# Patient Record
Sex: FEMALE | Race: WHITE | NOT HISPANIC OR LATINO | Employment: FULL TIME | URBAN - METROPOLITAN AREA
[De-identification: names, ages, dates, MRNs, and addresses within clinical notes are randomized per-mention and may not be internally consistent; named-entity substitution may affect disease eponyms.]

---

## 2019-12-08 ENCOUNTER — NURSE TRIAGE (OUTPATIENT)
Dept: NURSING | Facility: CLINIC | Age: 29
End: 2019-12-08

## 2019-12-09 NOTE — TELEPHONE ENCOUNTER
Patient cut finger with  about 10 mm long and bleeding has stopped. Caller wondering if she would need ER to get stitches if needed.  Patient may not need suturing at all, and is minor per guidelines.  They will go to  just to make sure.    Antonina Sauer RN  [facility]      Additional Information    Negative: [1] Major bleeding (e.g., actively dripping or spurting) AND [2] can't be stopped    Negative: Amputation    Negative: Shock suspected (e.g., cold/pale/clammy skin, too weak to stand, low BP, rapid pulse)    Negative: [1] Knife wound (or other possibly deep cut) AND [2] to chest, abdomen, back, neck, or head    Negative: [1] Cutter (self-mutilator) AND [2] suicidal or out-of-control    Negative: Sounds like a life-threatening emergency to the triager    Negative: [1] Bleeding AND [2] won't stop after 10 minutes of direct pressure (using correct technique)    Negative: Skin is split open or gaping  (or length > 1/2 inch or 12 mm on the skin, 1/4 inch or 6 mm on the face)    Negative: [1] Deep cut AND [2] can see bone or tendons    Negative: Sensation of something in the wound (i.e., retained object in wound)    Negative: [1] Dirt in the wound AND [2] not removed with 15 minutes of scrubbing    Negative: Wound causes numbness (i.e., loss of sensation)    Negative: Wound causes weakness (i.e., decreased ability to move hand, finger, toe)    Negative: [1] SEVERE pain AND [2] not improved 2 hours after pain medicine    Negative: [1] Looks infected AND [2] large red area (>2 inches or 5 cm) or streak    Negative: [1] Fever AND [2] bright red area or streak    Negative: Suspicious history for the injury    Negative: [1] Looks infected (spreading redness, pus) AND [2] no fever    Negative: [1] Last tetanus shot > 5 years ago AND [2] DIRTY cut    Negative: [1] Last tetanus shot > 10 years ago AND [2] CLEAN cut    Negative: No prior tetanus shots    Negative: [1] After 14 days AND [2] wound  isn't healed    Negative: [1] Has diabetes (diabetes mellitus) AND [2] minor cut or scratch on foot    Negative: [1] Cutter (self-mutilator) AND [2] stable (i.e., not suicidal, not out of control)    Minor cut or scratch    Protocols used: CUTS AND [id number]

## 2021-07-26 ENCOUNTER — LAB REQUISITION (OUTPATIENT)
Dept: LAB | Facility: CLINIC | Age: 31
End: 2021-07-26

## 2021-07-26 PROCEDURE — 86706 HEP B SURFACE ANTIBODY: CPT | Performed by: INTERNAL MEDICINE

## 2021-07-26 PROCEDURE — 86481 TB AG RESPONSE T-CELL SUSP: CPT | Performed by: INTERNAL MEDICINE

## 2021-07-26 PROCEDURE — 86787 VARICELLA-ZOSTER ANTIBODY: CPT | Performed by: INTERNAL MEDICINE

## 2021-07-27 LAB
HBV SURFACE AB SERPL IA-ACNC: 106.7 M[IU]/ML
VZV IGG SER QL IA: 803.8 INDEX
VZV IGG SER QL IA: POSITIVE

## 2021-07-31 LAB
GAMMA INTERFERON BACKGROUND BLD IA-ACNC: 0.01 IU/ML
M TB IFN-G BLD-IMP: NEGATIVE
M TB IFN-G CD4+ BCKGRND COR BLD-ACNC: 1.89 IU/ML
MITOGEN IGNF BCKGRD COR BLD-ACNC: 0.03 IU/ML
MITOGEN IGNF BCKGRD COR BLD-ACNC: 0.1 IU/ML
QUANTIFERON MITOGEN: 1.9 IU/ML
QUANTIFERON NIL TUBE: 0.01 IU/ML
QUANTIFERON TB1 TUBE: 0.04 IU/ML
QUANTIFERON TB2 TUBE: 0.11

## 2022-01-01 ENCOUNTER — TRANSFERRED RECORDS (OUTPATIENT)
Dept: MULTI SPECIALTY CLINIC | Facility: CLINIC | Age: 32
End: 2022-01-01

## 2022-01-01 LAB — PAP SMEAR - HIM PATIENT REPORTED: NEGATIVE

## 2022-02-06 ENCOUNTER — HEALTH MAINTENANCE LETTER (OUTPATIENT)
Age: 32
End: 2022-02-06

## 2022-04-14 ENCOUNTER — MEDICAL CORRESPONDENCE (OUTPATIENT)
Dept: HEALTH INFORMATION MANAGEMENT | Facility: CLINIC | Age: 32
End: 2022-04-14

## 2022-04-27 ENCOUNTER — TELEPHONE (OUTPATIENT)
Dept: ONCOLOGY | Facility: CLINIC | Age: 32
End: 2022-04-27

## 2022-06-16 ENCOUNTER — MEDICAL CORRESPONDENCE (OUTPATIENT)
Dept: HEALTH INFORMATION MANAGEMENT | Facility: CLINIC | Age: 32
End: 2022-06-16

## 2022-07-01 ENCOUNTER — OFFICE VISIT (OUTPATIENT)
Dept: URGENT CARE | Facility: URGENT CARE | Age: 32
End: 2022-07-01
Payer: COMMERCIAL

## 2022-07-01 VITALS
TEMPERATURE: 98.7 F | BODY MASS INDEX: 20.73 KG/M2 | RESPIRATION RATE: 18 BRPM | DIASTOLIC BLOOD PRESSURE: 60 MMHG | HEIGHT: 69 IN | WEIGHT: 140 LBS | OXYGEN SATURATION: 98 % | SYSTOLIC BLOOD PRESSURE: 97 MMHG | HEART RATE: 100 BPM

## 2022-07-01 DIAGNOSIS — J06.9 VIRAL URI: Primary | ICD-10-CM

## 2022-07-01 LAB
DEPRECATED S PYO AG THROAT QL EIA: NEGATIVE
GROUP A STREP BY PCR: NOT DETECTED
SARS-COV-2 RNA RESP QL NAA+PROBE: NEGATIVE

## 2022-07-01 PROCEDURE — 87651 STREP A DNA AMP PROBE: CPT | Performed by: FAMILY MEDICINE

## 2022-07-01 PROCEDURE — U0005 INFEC AGEN DETEC AMPLI PROBE: HCPCS | Performed by: FAMILY MEDICINE

## 2022-07-01 PROCEDURE — 99203 OFFICE O/P NEW LOW 30 MIN: CPT | Performed by: FAMILY MEDICINE

## 2022-07-01 PROCEDURE — U0003 INFECTIOUS AGENT DETECTION BY NUCLEIC ACID (DNA OR RNA); SEVERE ACUTE RESPIRATORY SYNDROME CORONAVIRUS 2 (SARS-COV-2) (CORONAVIRUS DISEASE [COVID-19]), AMPLIFIED PROBE TECHNIQUE, MAKING USE OF HIGH THROUGHPUT TECHNOLOGIES AS DESCRIBED BY CMS-2020-01-R: HCPCS | Performed by: FAMILY MEDICINE

## 2022-07-01 NOTE — PROGRESS NOTES
Subjective: A cold seems to be going through the household, both kids had cold symptoms and then he developed ear infections but amazingly they got better so fast with antibiotics that she wondered if there might be strep going on.  She has had 2 days of aches and chills and now today has developed a sore throat with no real congestion or cough.  She has done 3 COVID test at home that are negative.  Her  now has a fever as well.    Objective: Vitals are stable.  NAD.  ENT is normal.  Neck with a few anterior nodes that are slightly tender.  The lungs are clear.  The heart is regular without murmurs.  Abdomen benign.  Strep test negative.    Assessment and plan: Viral URI.  Rule out COVID which seems unlikely with 3 negative tests at home.  We talked about the possibility of influenza but agreed it would not really make any difference.

## 2022-07-20 DIAGNOSIS — M62.830 BACK MUSCLE SPASM: Primary | ICD-10-CM

## 2022-07-20 RX ORDER — METHYLPREDNISOLONE 4 MG
TABLET, DOSE PACK ORAL
Qty: 21 TABLET | Refills: 0 | Status: SHIPPED | OUTPATIENT
Start: 2022-07-20 | End: 2022-08-24

## 2022-08-25 ENCOUNTER — OFFICE VISIT (OUTPATIENT)
Dept: FAMILY MEDICINE | Facility: CLINIC | Age: 32
End: 2022-08-25
Payer: COMMERCIAL

## 2022-08-25 VITALS
WEIGHT: 141 LBS | DIASTOLIC BLOOD PRESSURE: 65 MMHG | RESPIRATION RATE: 16 BRPM | BODY MASS INDEX: 21.37 KG/M2 | HEIGHT: 68 IN | TEMPERATURE: 97.2 F | SYSTOLIC BLOOD PRESSURE: 107 MMHG | OXYGEN SATURATION: 98 % | HEART RATE: 79 BPM

## 2022-08-25 DIAGNOSIS — Z87.59 HISTORY OF AMNIOTIC FLUID EMBOLISM: ICD-10-CM

## 2022-08-25 DIAGNOSIS — Z13.0 SCREENING, ANEMIA, DEFICIENCY, IRON: ICD-10-CM

## 2022-08-25 DIAGNOSIS — Z87.42 HISTORY OF ABNORMAL CERVICAL PAP SMEAR: ICD-10-CM

## 2022-08-25 DIAGNOSIS — Z13.6 ENCOUNTER FOR LIPID SCREENING FOR CARDIOVASCULAR DISEASE: ICD-10-CM

## 2022-08-25 DIAGNOSIS — Z13.1 SCREENING FOR DIABETES MELLITUS: ICD-10-CM

## 2022-08-25 DIAGNOSIS — Z00.00 HEALTH CARE MAINTENANCE: ICD-10-CM

## 2022-08-25 DIAGNOSIS — Z11.4 SCREENING FOR HIV (HUMAN IMMUNODEFICIENCY VIRUS): ICD-10-CM

## 2022-08-25 DIAGNOSIS — Z11.59 NEED FOR HEPATITIS C SCREENING TEST: ICD-10-CM

## 2022-08-25 DIAGNOSIS — N64.4 BREAST TENDERNESS: ICD-10-CM

## 2022-08-25 DIAGNOSIS — Z12.4 CERVICAL CANCER SCREENING: ICD-10-CM

## 2022-08-25 DIAGNOSIS — E28.2 PCOS (POLYCYSTIC OVARIAN SYNDROME): ICD-10-CM

## 2022-08-25 DIAGNOSIS — Z13.220 ENCOUNTER FOR LIPID SCREENING FOR CARDIOVASCULAR DISEASE: ICD-10-CM

## 2022-08-25 DIAGNOSIS — Z00.00 ROUTINE GENERAL MEDICAL EXAMINATION AT A HEALTH CARE FACILITY: Primary | ICD-10-CM

## 2022-08-25 PROBLEM — O88.113: Status: ACTIVE | Noted: 2021-12-05

## 2022-08-25 PROBLEM — O99.820 GBS (GROUP B STREPTOCOCCUS CARRIER), +RV CULTURE, CURRENTLY PREGNANT: Status: RESOLVED | Noted: 2021-11-26 | Resolved: 2022-08-25

## 2022-08-25 PROBLEM — O99.820 GBS (GROUP B STREPTOCOCCUS CARRIER), +RV CULTURE, CURRENTLY PREGNANT: Status: ACTIVE | Noted: 2021-11-26

## 2022-08-25 PROBLEM — O88.113: Status: RESOLVED | Noted: 2021-12-05 | Resolved: 2022-08-25

## 2022-08-25 LAB
ALBUMIN SERPL-MCNC: 4.1 G/DL (ref 3.4–5)
ALP SERPL-CCNC: 90 U/L (ref 40–150)
ALT SERPL W P-5'-P-CCNC: 20 U/L (ref 0–50)
ANION GAP SERPL CALCULATED.3IONS-SCNC: 5 MMOL/L (ref 3–14)
AST SERPL W P-5'-P-CCNC: 15 U/L (ref 0–45)
BASOPHILS # BLD AUTO: 0 10E3/UL (ref 0–0.2)
BASOPHILS NFR BLD AUTO: 1 %
BILIRUB SERPL-MCNC: 1.1 MG/DL (ref 0.2–1.3)
BUN SERPL-MCNC: 10 MG/DL (ref 7–30)
CALCIUM SERPL-MCNC: 8.8 MG/DL (ref 8.5–10.1)
CHLORIDE BLD-SCNC: 109 MMOL/L (ref 94–109)
CHOLEST SERPL-MCNC: 148 MG/DL
CO2 SERPL-SCNC: 25 MMOL/L (ref 20–32)
CREAT SERPL-MCNC: 0.84 MG/DL (ref 0.52–1.04)
EOSINOPHIL # BLD AUTO: 0.2 10E3/UL (ref 0–0.7)
EOSINOPHIL NFR BLD AUTO: 6 %
ERYTHROCYTE [DISTWIDTH] IN BLOOD BY AUTOMATED COUNT: 13.2 % (ref 10–15)
FASTING STATUS PATIENT QL REPORTED: YES
GFR SERPL CREATININE-BSD FRML MDRD: >90 ML/MIN/1.73M2
GLUCOSE BLD-MCNC: 94 MG/DL (ref 70–99)
HBA1C MFR BLD: 5.3 % (ref 0–5.6)
HCT VFR BLD AUTO: 42.2 % (ref 35–47)
HCV AB SERPL QL IA: NONREACTIVE
HDLC SERPL-MCNC: 58 MG/DL
HGB BLD-MCNC: 13.8 G/DL (ref 11.7–15.7)
HIV 1+2 AB+HIV1 P24 AG SERPL QL IA: NONREACTIVE
IMM GRANULOCYTES # BLD: 0 10E3/UL
IMM GRANULOCYTES NFR BLD: 0 %
LDLC SERPL CALC-MCNC: 80 MG/DL
LYMPHOCYTES # BLD AUTO: 1.4 10E3/UL (ref 0.8–5.3)
LYMPHOCYTES NFR BLD AUTO: 35 %
MCH RBC QN AUTO: 28.7 PG (ref 26.5–33)
MCHC RBC AUTO-ENTMCNC: 32.7 G/DL (ref 31.5–36.5)
MCV RBC AUTO: 88 FL (ref 78–100)
MONOCYTES # BLD AUTO: 0.3 10E3/UL (ref 0–1.3)
MONOCYTES NFR BLD AUTO: 7 %
NEUTROPHILS # BLD AUTO: 2.1 10E3/UL (ref 1.6–8.3)
NEUTROPHILS NFR BLD AUTO: 51 %
NONHDLC SERPL-MCNC: 90 MG/DL
PLATELET # BLD AUTO: 261 10E3/UL (ref 150–450)
POTASSIUM BLD-SCNC: 4.4 MMOL/L (ref 3.4–5.3)
PROT SERPL-MCNC: 7.9 G/DL (ref 6.8–8.8)
RBC # BLD AUTO: 4.81 10E6/UL (ref 3.8–5.2)
SODIUM SERPL-SCNC: 139 MMOL/L (ref 133–144)
TRIGL SERPL-MCNC: 49 MG/DL
TSH SERPL DL<=0.005 MIU/L-ACNC: 0.8 MU/L (ref 0.4–4)
WBC # BLD AUTO: 4 10E3/UL (ref 4–11)

## 2022-08-25 PROCEDURE — 87389 HIV-1 AG W/HIV-1&-2 AB AG IA: CPT | Performed by: FAMILY MEDICINE

## 2022-08-25 PROCEDURE — 99395 PREV VISIT EST AGE 18-39: CPT | Performed by: FAMILY MEDICINE

## 2022-08-25 PROCEDURE — 80050 GENERAL HEALTH PANEL: CPT | Performed by: FAMILY MEDICINE

## 2022-08-25 PROCEDURE — 86803 HEPATITIS C AB TEST: CPT | Performed by: FAMILY MEDICINE

## 2022-08-25 PROCEDURE — 80061 LIPID PANEL: CPT | Performed by: FAMILY MEDICINE

## 2022-08-25 PROCEDURE — 83036 HEMOGLOBIN GLYCOSYLATED A1C: CPT | Performed by: FAMILY MEDICINE

## 2022-08-25 PROCEDURE — 36415 COLL VENOUS BLD VENIPUNCTURE: CPT | Performed by: FAMILY MEDICINE

## 2022-08-25 ASSESSMENT — ENCOUNTER SYMPTOMS
BREAST MASS: 0
NERVOUS/ANXIOUS: 0
MYALGIAS: 0
HEARTBURN: 0
HEMATURIA: 0
ABDOMINAL PAIN: 0
JOINT SWELLING: 0
FEVER: 0
DIARRHEA: 0
HEADACHES: 0
EYE PAIN: 0
WEAKNESS: 0
SORE THROAT: 0
COUGH: 0
PARESTHESIAS: 0
HEMATOCHEZIA: 0
CONSTIPATION: 0
DYSURIA: 0
DIZZINESS: 0
FREQUENCY: 0
PALPITATIONS: 0
SHORTNESS OF BREATH: 0
CHILLS: 0
NAUSEA: 0
ARTHRALGIAS: 0

## 2022-08-25 NOTE — RESULT ENCOUNTER NOTE
Dunia Ms. Martinez,  Your results came back and are within acceptable limits. -Hepatitis C antibody screen test shows no signs of a previous hepatitis C infection.  -HIV test is normal.. If you have any further concerns please do not hesitate to contact us by message, phone or making an appointment.  Have a good day   Dr Franco DUKE

## 2022-08-25 NOTE — RESULT ENCOUNTER NOTE
Dunia Ms. Martinez,  Your results came back and are within acceptable limits. -Cholesterol levels (LDL,HDL, Triglycerides) are normal.  ADVISE: rechecking in 1 year.   -Liver and gallbladder tests are normal (ALT,AST, Alk phos, bilirubin), kidney function is normal (Cr, GFR), sodium is normal, potassium is normal, calcium is normal, glucose is normal.  -TSH (thyroid stimulating hormone) level is normal which indicates normal thyroid function.. If you have any further concerns please do not hesitate to contact us by message, phone or making an appointment.  Have a good day   Dr Franco DUKE

## 2022-08-25 NOTE — RESULT ENCOUNTER NOTE
Dunia CohenJesus Martinez,  Some of your results came back and are within acceptable limits. -Normal red blood cell (hgb) levels, normal white blood cell count and normal platelet levels.  -A1C (diabetic test) is normal and indicates that your blood sugar has been in a normal range the last 3 months.. If you have any further concerns please do not hesitate to contact us by message, phone or making an appointment.  Have a good day   Dr Franco DUKE

## 2022-08-25 NOTE — Clinical Note
Patient reports had a pap in 1/2022 which was normal. I cant see in careeverywhere. If your fid it let me know. Maybw was done else where not sure

## 2022-08-25 NOTE — PROGRESS NOTES
SUBJECTIVE:   CC: Shantel Martinez is an 32 year old woman who presents for preventive health visit.     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 ,  PA in GI oncology at the , lives with  and 2 sons ( almost 3 & 8 month old), has 2 cats, with hx of PCOS( irregular periods no other symptoms, able to get pregnant x 2 without help), remote abnormal pap's in past, ASCUS with HR HPV in , LSIL in , s/p colposcopy, & ECC n her 20's was normal, , hx of GBS treated in , amniotic fluid embolism in 3rd trimester requiring an emergency C section in dec 2021, hx of post partum anxiety , improved with therapy, didnt find citalopram helpful, hx of mastitis, hx of wisdom teeth extraction, allergic to sulfa, previously under care of PCP at [facility] and OB at [facility]    [place]  ( [facility]  to present  2003: [facility]: 13 yr [facility], uri's  2005: uri's  x2  2006: headache, corneal abrasion,   2007: left ankle sprain, scoliosis, primary amenorrhea, right thumb contusion with mild subungual hematoma  2008: obstipation & abdominal pain, menarche age 19, sinusitis, bronchitis, secondary amenorrhea, workup negative, positive progesterone withdrawal bleeding  2009; right breast lump prior breast u/s negative, secondary amenorrhea, sports physical, pharyngitis, right ankle sprain, college physical( [facility])   2010: pap done, on BCP for menstruation, , cystitis,   2011:  BPPV, wellwoman, heart burn, travel to [place],   : s/B gyn for secondary amenorrhea,  Leg pain referred to PT, restarted combined ocp 2012 for irregular cycles and acne, costochondral pain,   : ASCUS HR HPV, colposcopy, in PA school, intermenstrual spotting,    [facility] now: LSIL, dysuria, annual physical with OB,   2015: PA school [employer] for physical , left upper trapezius spasm, strep pharyngitis,   2016: annual physical, on [facility]  2019: preconception counseling, noted was diagnosed with PCOS around age 18 secondary to infrequent menstrual cycles and pelvic ultrasound showing multiple cysts on the ovaries. She has been managed with OCP's since then and has had regular, monthly menstrual cycles with OCP's. She stopped her birth control pill back in 2018 in an attempt to start for pregnancy, infertility eval started.   2019 first ob visit. Routine prenatal checks, finger laceration on a spiralizer, closed with derma bond,  2020:   ; post partum check, mastitis,   2021 noted second pregnancy u/s, regular prenatal checks,   21: Patient presented in labor  and suspected amniotic fluid embolism immediately following AROM given vital sign and patient changes. She had two episodes of decompensation, leading to emergent PLTCS under GETA on 21. Extubated following delivery. Monitored in ICU overnight for 8 hours s/p primary CS following likely AFE. Since delivery vital signs stable, no evidence of DIC/coagulopathy.   Postpartum course was uncomplicated.  22 routine post partum check: notedthe experience was very scary for both her and her  and she spent a short time in the ICU before being transferred back to L&D floor. She did not have DIC or other complications. After being discharged, her whole family got COVID-19 including her baby which made the 1st few weeks postpartum very difficult as well. She also had mastitis which was treated at the  end of the year 2021 1/28/22 telemed for post partum anxiety/ depression. Seeing a therapist. Given Celexa.     Here for a physical as insurance changed & not seen a PCP joel while, but was under care of OB till 6 months ago.  Notes has had left breast tenderness. 8 months ago delivered a baby, had hx of two bouts of mastitis treated with antibiotics and symptomatic care. Stopped breast feeding in 3/2022. Left breat tenderness lingers n. Breasts feel lumpy, no discrete lump, nipple discharge or skin changes. Not any worse but unchanged. Has had resumption of menstruation since stopped breast feeding. Pain fluctuates,comes and goes, not sure correlation to cycle which has bene historically irregular. LMP currently started Monday so day 4 today.   No fh of breast, ovarian or colon cancer  Not on BCP currently, using condoms.     Hx of amniotic fluid embolism in 3rd trimester after AROM while in labour in dec 2021, lost consciousness x 2, intubated had emergency C section. Was in ICU, told had fluid in lung that stabilized. Was advised to seek [facility] care prior to getting pregnant again so would like a referral.     Postpartum anxiety/depression resolved with therapy. Had tried Celexa initially but stopped as not helpful    Reports had pap post partum normal in 1/202. No records visible in care everywhere. Told to recheck in 3 yrs. Remote abnormal when [age], had ASCUS with HR HPV, s/p colpo showed LSIL in 2014, reported normal since then pat 9 yrs.    Hx of reported PCOs, irregular period, cysts on ovary when evaluated age 18 for secondary amenorrhea. No hirsutism or known insulin resistance. Has irregular periods off ocp, Ovulates 2 to 3 / yr. but able to tell when ovulating with temperature and was able to get pregnant x 2 without any help. Currently using condoms     reviewed  No ACP on file, working on it at home, no infor needed  Will do labs today     Today's PHQ-2 Score:   PHQ-2 ( 1999 Pf[employer]) 8/25/2022   Q1:  Little interest or pleasure in doing things 0   Q2: Feeling down, depressed or hopeless 0   PHQ-2 Score 0   Q1: Little interest or pleasure in doing things Not at all   Q2: Feeling down, depressed or hopeless Not at all   PHQ-2 Score 0     Abuse: Current or Past (Physical, Sexual or Emotional) - No  Do you feel safe in your environment? Yes    Have you ever done Advance Care Planning? (For example, a Health Directive, POLST, or a discussion with a medical provider or your loved ones about your wishes): No, advance care planning information given to patient to review.  Patient plans to discuss their wishes with loved ones or provider.      Social History     Tobacco Use     Smoking status: Never Smoker     Smokeless tobacco: Never Used   Substance Use Topics     Alcohol use: Not on file     If you drink alcohol do you typically have >3 drinks per day or >7 drinks per week? No    Alcohol Use 2022   Prescreen: >3 drinks/day or >7 drinks/week? No   Prescreen: >3 drinks/day or >7 drinks/week? -   No flowsheet data found.    Reviewed orders with patient.  Reviewed health maintenance and updated orders accordingly - Yes  Lab work is in process  Labs reviewed in EPIC  BP Readings from Last 3 Encounters:   22 107/65   22 97/60    Wt Readings from Last 3 Encounters:   22 64 kg (141 lb)   22 63.5 kg (140 lb)          Patient Active Problem List   Diagnosis     PCOS (polycystic ovarian syndrome)     History of amniotic fluid embolism     Past Surgical History:   Procedure Laterality Date      SECTION      due toamniotic fluid embolism     COLPOSCOPY CERVIX, BIOPSY CERVIX, ENDOCERVICAL CURETTAGE, COMBINED  2014     WISDOM TOOTH EXTRACTION         Social History     Tobacco Use     Smoking status: Never Smoker     Smokeless tobacco: Never Used   Substance Use Topics     Alcohol use: Yes     Comment: occasional     Family History   Problem Relation Age of Onset     Prostate Cancer Father 55          No current outpatient medications on file.     Allergies   Allergen Reactions     Bactrim [Sulfamethoxazole W/Trimethoprim] Other (See Comments)     Throat swelling     Recent Labs   Lab Test 22  0952   A1C 5.3   LDL 80   HDL 58   TRIG 49   ALT 20   CR 0.84   GFRESTIMATED >90   POTASSIUM 4.4   TSH 0.80        Breast Cancer Screening:    FHS-7: No flowsheet data found.    Patient under 40 years of age: Routine Mammogram Screening not recommended.   Pertinent mammograms are reviewed under the imaging tab.    History of abnormal Pap smear: NO - age 30- 65 PAP every 3 years recommended  NO - age 30-65 PAP every 5 years with negative HPV co-testing recommended  Last 3 Pap Results: No results found for: PAP  Last 3 Pap and HPV Results:       Reviewed and updated as needed this visit by clinical staff   Tobacco  Allergies  Meds   Med Hx  Surg Hx  Fam Hx  Soc Hx          Reviewed and updated as needed this visit by Provider                   Past Medical History:   Diagnosis Date     Amniotic fluid embolus, third trimester 2021     GBS (group B Streptococcus carrier), +RV culture, currently pregnant 2021     History of abnormal Pap smear 2014    Formatting of this note is different from the original. Date Procedure Result Plan  2013 Pap ASCUS HPV+   2014 Pap LSIL Colpo  2014 Colpo ECC negative Pap 1 year      (normal spontaneous vaginal delivery)       Past Surgical History:   Procedure Laterality Date      SECTION      due toamniotic fluid embolism     COLPOSCOPY CERVIX, BIOPSY CERVIX, ENDOCERVICAL CURETTAGE, COMBINED       WISDOM TOOTH EXTRACTION       OB History    Para Term  AB Living   2 2 2 0 0 2   SAB IAB Ectopic Multiple Live Births   0 0 0 0 2      # Outcome Date GA Lbr Anand/2nd Weight Sex Delivery Anes PTL Lv   2 Term 21 38w0d  3.544 kg (7 lb 13 oz) M CS-Unspec   GATO      Name: DECLAN SARMIENTO      Apgar1: 9  Apgar5: 9   1 Term 20  "39w1d / 00:27 3.232 kg (7 lb 2 oz) M Vag-Spont   GATO      Name: DECLAN SARMIENTO      Apgar1: 9  Apgar5: 9       Review of Systems   Constitutional: Negative for chills and fever.   HENT: Negative for congestion, ear pain, hearing loss and sore throat.    Eyes: Negative for pain and visual disturbance.   Respiratory: Negative for cough and shortness of breath.    Cardiovascular: Negative for chest pain, palpitations and peripheral edema.   Gastrointestinal: Negative for abdominal pain, constipation, diarrhea, heartburn, hematochezia and nausea.   Breasts:  Positive for tenderness. Negative for breast mass and discharge.   Genitourinary: Negative for dysuria, frequency, genital sores, hematuria, pelvic pain, urgency, vaginal bleeding and vaginal discharge.   Musculoskeletal: Negative for arthralgias, joint swelling and myalgias.   Skin: Negative for rash.   Neurological: Negative for dizziness, weakness, headaches and paresthesias.   Psychiatric/Behavioral: Negative for mood changes. The patient is not nervous/anxious.       OBJECTIVE:   /65 (BP Location: Right arm, Patient Position: Sitting, Cuff Size: Adult Regular)   Pulse 79   Temp 97.2  F (36.2  C) (Oral)   Resp 16   Ht 1.73 m (5' 8.11\")   Wt 64 kg (141 lb)   LMP 08/25/2022 (Approximate)   SpO2 98%   BMI 21.37 kg/m    Physical Exam  GENERAL: healthy, alert and no distress  EYES: Eyes grossly normal to inspection, PERRL and conjunctivae and sclerae normal  HENT: ear canals and TM's normal, nose and mouth without ulcers or lesions  NECK: no adenopathy, no asymmetry, masses, or scars and thyroid normal to palpation  RESP: lungs clear to auscultation - no rales, rhonchi or wheezes  BREAST: normal without masses, tenderness or nipple discharge and no palpable axillary masses or adenopathy  CV: regular rate and rhythm, normal S1 S2, no S3 or S4, no murmur, click or rub, no peripheral edema and peripheral pulses strong  ABDOMEN: soft, nontender, no " hepatosplenomegaly, no masses and bowel sounds normal  MS: no gross musculoskeletal defects noted, no edema  SKIN: no suspicious lesions or rashes  NEURO: Normal strength and tone, mentation intact and speech normal  PSYCH: mentation appears normal, affect normal/bright  LYMPH: no cervical, supraclavicular, axillary, or inguinal adenopathy    Diagnostic Test Results:  Labs reviewed in Epic  No results found for this or any previous visit (from the past 24 hour(s)).  Results for orders placed or performed in visit on 08/25/22   HIV Antigen Antibody Combo     Status: Normal   Result Value Ref Range    HIV Antigen Antibody Combo Nonreactive Nonreactive   Hepatitis C Screen Reflex to HCV RNA Quant and Genotype     Status: Normal   Result Value Ref Range    Hepatitis C Antibody Nonreactive Nonreactive    Narrative    Assay performance characteristics have not been established for newborns, infants, and children.   Comprehensive metabolic panel (BMP + Alb, Alk Phos, ALT, AST, Total. Bili, TP)     Status: Normal   Result Value Ref Range    Sodium 139 133 - 144 mmol/L    Potassium 4.4 3.4 - 5.3 mmol/L    Chloride 109 94 - 109 mmol/L    Carbon Dioxide (CO2) 25 20 - 32 mmol/L    Anion Gap 5 3 - 14 mmol/L    Urea Nitrogen 10 7 - 30 mg/dL    Creatinine 0.84 0.52 - 1.04 mg/dL    Calcium 8.8 8.5 - 10.1 mg/dL    Glucose 94 70 - 99 mg/dL    Alkaline Phosphatase 90 40 - 150 U/L    AST 15 0 - 45 U/L    ALT 20 0 - 50 U/L    Protein Total 7.9 6.8 - 8.8 g/dL    Albumin 4.1 3.4 - 5.0 g/dL    Bilirubin Total 1.1 0.2 - 1.3 mg/dL    GFR Estimate >90 >60 mL/min/1.73m2   Lipid Profile (Chol, Trig, HDL, LDL calc)     Status: None   Result Value Ref Range    Cholesterol 148 <200 mg/dL    Triglycerides 49 <150 mg/dL    Direct Measure HDL 58 >=50 mg/dL    LDL Cholesterol Calculated 80 <=100 mg/dL    Non HDL Cholesterol 90 <130 mg/dL    Patient Fasting > 8hrs? Yes     Narrative    Cholesterol  Desirable:  <200 mg/dL    Triglycerides  Normal:  Less  than 150 mg/dL  Borderline High:  150-199 mg/dL  High:  200-499 mg/dL  Very High:  Greater than or equal to 500 mg/dL    Direct Measure HDL  Female:  Greater than or equal to 50 mg/dL   Male:  Greater than or equal to 40 mg/dL    LDL Cholesterol  Desirable:  <100mg/dL  Above Desirable:  100-129 mg/dL   Borderline High:  130-159 mg/dL   High:  160-189 mg/dL   Very High:  >= 190 mg/dL    Non HDL Cholesterol  Desirable:  130 mg/dL  Above Desirable:  130-159 mg/dL  Borderline High:  160-189 mg/dL  High:  190-219 mg/dL  Very High:  Greater than or equal to 220 mg/dL   TSH with free T4 reflex     Status: Normal   Result Value Ref Range    TSH 0.80 0.40 - 4.00 mU/L   Hemoglobin A1c     Status: Normal   Result Value Ref Range    Hemoglobin A1C 5.3 0.0 - 5.6 %   CBC with platelets and differential     Status: None   Result Value Ref Range    WBC Count 4.0 4.0 - 11.0 10e3/uL    RBC Count 4.81 3.80 - 5.20 10e6/uL    Hemoglobin 13.8 11.7 - 15.7 g/dL    Hematocrit 42.2 35.0 - 47.0 %    MCV 88 78 - 100 fL    MCH 28.7 26.5 - 33.0 pg    MCHC 32.7 31.5 - 36.5 g/dL    RDW 13.2 10.0 - 15.0 %    Platelet Count 261 150 - 450 10e3/uL    % Neutrophils 51 %    % Lymphocytes 35 %    % Monocytes 7 %    % Eosinophils 6 %    % Basophils 1 %    % Immature Granulocytes 0 %    Absolute Neutrophils 2.1 1.6 - 8.3 10e3/uL    Absolute Lymphocytes 1.4 0.8 - 5.3 10e3/uL    Absolute Monocytes 0.3 0.0 - 1.3 10e3/uL    Absolute Eosinophils 0.2 0.0 - 0.7 10e3/uL    Absolute Basophils 0.0 0.0 - 0.2 10e3/uL    Absolute Immature Granulocytes 0.0 <=0.4 10e3/uL   CBC with platelets and differential     Status: None    Narrative    The following orders were created for panel order CBC with platelets and differential.  Procedure                               Abnormality         Status                     ---------                               -----------         ------                     CBC with platelets and d...[[id number]]                      Final result                  Please view results for these tests on the individual orders.       ASSESSMENT/PLAN:       ICD-10-CM    1. Routine general medical examination at a health care facility  Z00.00 CBC with platelets and differential     Comprehensive metabolic panel (BMP + Alb, Alk Phos, ALT, AST, Total. Bili, TP)     Lipid Profile (Chol, Trig, HDL, LDL calc)     TSH with free T4 reflex     Hemoglobin A1c     CBC with platelets and differential     Comprehensive metabolic panel (BMP + Alb, Alk Phos, ALT, AST, Total. Bili, TP)     Lipid Profile (Chol, Trig, HDL, LDL calc)     TSH with free T4 reflex     Hemoglobin A1c   2. Cervical cancer screening  Z12.4     reports had pap post partum 1/2022 was normal   3. History of abnormal cervical Pap smear  Z87.42    4. Breast tenderness  N64.4 US Breast Left Limited 1-3 Quadrants     MA Diagnostic Digital Left     TSH with free T4 reflex     TSH with free T4 reflex    left only    5. History of amniotic fluid embolism  Z87.59 Mat Fetal Med CTR Referral - Preconception   6. PCOS (polycystic ovarian syndrome)  E28.2 TSH with free T4 reflex     Hemoglobin A1c     TSH with free T4 reflex     Hemoglobin A1c    irregular periods, no other symptoms, got pregnant x 2 without help   7. Health care maintenance  Z00.00 REVIEW OF HEALTH MAINTENANCE PROTOCOL ORDERS   8. Screening for HIV (human immunodeficiency virus)  Z11.4 HIV Antigen Antibody Combo     HIV Antigen Antibody Combo   9. Need for hepatitis C screening test  Z11.59 Hepatitis C Screen Reflex to HCV RNA Quant and Genotype     Hepatitis C Screen Reflex to HCV RNA Quant and Genotype   10. Screening, anemia, deficiency, iron  Z13.0 CBC with platelets and differential     CBC with platelets and differential   11. Screening for diabetes mellitus  Z13.1 Comprehensive metabolic panel (BMP + Alb, Alk Phos, ALT, AST, Total. Bili, TP)     Comprehensive metabolic panel (BMP + Alb, Alk Phos, ALT, AST, Total. Bili, TP)   12. Encounter for  lipid screening for cardiovascular disease  Z13.220 Lipid Profile (Chol, Trig, HDL, LDL calc)    Z13.6 Lipid Profile (Chol, Trig, HDL, LDL calc)     Seen for preventive health and additional concerns today     For persistent left breast tenderness despite treatment and resolution of mastitis will do a breast u/s, +/- diagnostic mammogram. Unknown if correlating to menstrual cycle resumed since breast feeding stopped in march 2022. Exam normal , has dense breasts.   Continue self breast check regularly   Consider referral to a  to assess personal risk if should have any family hx of breast, ovarian or bowel cancer  Has No fh of breast, ovarian or colon cancer  Not on BCP currently, using condoms.     Remote abnormal when age [age], had ASCUS with HR HPV, s/p colpo showed LSIL in 2014, reported normal since then pat 9 yrs.Reports had a pap in 1/2022 which was normal. I cant see in careeverywhere. Reports due 2024.     Hx of amniotic fluid embolism in 3rd trimester after AROM while in labour in dec 2021, lost consciousness x 2, intubated & had emergency C section. Was in ICU, post surgery for monitoring. Was advised to seek MFM care prior to getting pregnant again so would like a referral. MFM preconception counseling referral placed.    Postpartum anxiety/depression resolved with therapy. Had tried Celexa initially but stopped as not helpful. Currently notes doing ok.     Hx of reported PCOs, irregular period, cysts on ovary when evaluated age 18 for secondary amenorrhea. No hirsutism or known insulin resistance. Has irregular periods off ocp, Ovulates 2 to 3 / yr. but able to tell when ovulating with temperature and was able to get pregnant x 2 without any help. Currently using condoms     reviewed  No ACP on file, working on it at home, no infor needed  Covid vaccine utd ( had covid in 1/2022 post partum)   Recommend Flu shot yearly  Recommend Tdap every 10 yrs  If travelling out of the country recommend  "seeing the travel clinic to update appropriate shots etc  Labs today and will make further recommendations once reviewed    Return in 1 yr for a preventive physical and sooner in an office visit for any new concerns.     Patient has been advised of split billing requirements and indicates understanding: Yes    COUNSELING:  Reviewed preventive health counseling, as reflected in patient instructions       Regular exercise       Healthy diet/nutrition       Vision screening       Immunizations       Contraception       Family planning       Consider Hep C screening for all patients one time for ages 18-79 years       HIV screeninx in teen years, 1x in adult years, and at intervals if high risk       The ASCVD Risk score ([place] Jr[place] et al., 2013) failed to calculate for the following reasons:    The 2013 ASCVD risk score is only valid for ages 40 to 79       Advance Care Planning    Estimated body mass index is 21.37 kg/m  as calculated from the following:    Height as of this encounter: 1.73 m (5' 8.11\").    Weight as of this encounter: 64 kg (141 lb).    Weight management plan noted, stable and monitoring    She reports that she has never smoked. She has never used smokeless tobacco.      Counseling Resources:  ATP IV Guidelines  Pooled Cohorts Equation Calculator  Breast Cancer Risk Calculator  BRCA-Related Cancer Risk Assessment: FHS-7 Tool  FRAX Risk Assessment  ICSI Preventive Guidelines  Dietary Guidelines for Americans,   USDA's MyPlate  ASA Prophylaxis  Lung CA Screening    Kanika Gamez MD  [facility]  "

## 2022-08-25 NOTE — PATIENT INSTRUCTIONS
See notes    Preventive Health Recommendations  Female Ages 26 - 39  Yearly exam:   See your health care provider every year in order to  Review health changes.   Discuss preventive care.    Review your medicines if you your doctor has prescribed any.    Until age 30: Get a Pap test every three years (more often if you have had an abnormal result).    After age 30: Talk to your doctor about whether you should have a Pap test every 3 years or have a Pap test with HPV screening every 5 years.   You do not need a Pap test if your uterus was removed (hysterectomy) and you have not had cancer.  You should be tested each year for STDs (sexually transmitted diseases), if you're at risk.   Talk to your provider about how often to have your cholesterol checked.  If you are at risk for diabetes, you should have a diabetes test (fasting glucose).  Shots: Get a flu shot each year. Get a tetanus shot every 10 years.   Nutrition:   Eat at least 5 servings of fruits and vegetables each day.  Eat whole-grain bread, whole-wheat pasta and brown rice instead of white grains and rice.  Get adequate Calcium and Vitamin D.     Lifestyle  Exercise at least 150 minutes a week (30 minutes a day, 5 days of the week). This will help you control your weight and prevent disease.  Limit alcohol to one drink per day.  No smoking.   Wear sunscreen to prevent skin cancer.  See your dentist every six months for an exam and cleaning.

## 2022-08-26 DIAGNOSIS — Z87.59 HISTORY OF AMNIOTIC FLUID EMBOLISM: Primary | ICD-10-CM

## 2022-08-28 PROBLEM — Z87.59: Status: ACTIVE | Noted: 2022-08-28

## 2022-09-08 ENCOUNTER — ANCILLARY PROCEDURE (OUTPATIENT)
Dept: MAMMOGRAPHY | Facility: CLINIC | Age: 32
End: 2022-09-08
Attending: FAMILY MEDICINE
Payer: COMMERCIAL

## 2022-09-08 DIAGNOSIS — N64.4 BREAST TENDERNESS: ICD-10-CM

## 2022-09-08 PROCEDURE — 76882 US LMTD JT/FCL EVL NVASC XTR: CPT | Mod: RT | Performed by: STUDENT IN AN ORGANIZED HEALTH CARE EDUCATION/TRAINING PROGRAM

## 2022-09-09 ENCOUNTER — ANCILLARY PROCEDURE (OUTPATIENT)
Dept: MAMMOGRAPHY | Facility: CLINIC | Age: 32
End: 2022-09-09
Attending: FAMILY MEDICINE
Payer: COMMERCIAL

## 2022-09-09 DIAGNOSIS — R92.1 BREAST CALCIFICATIONS: ICD-10-CM

## 2022-09-09 DIAGNOSIS — N64.4 BREAST TENDERNESS: ICD-10-CM

## 2022-09-09 PROCEDURE — 88305 TISSUE EXAM BY PATHOLOGIST: CPT | Mod: 26 | Performed by: PATHOLOGY

## 2022-09-09 PROCEDURE — 19081 BX BREAST 1ST LESION STRTCTC: CPT | Mod: LT | Performed by: RADIOLOGY

## 2022-09-09 PROCEDURE — 88305 TISSUE EXAM BY PATHOLOGIST: CPT | Mod: TC | Performed by: FAMILY MEDICINE

## 2022-09-09 RX ORDER — LIDOCAINE HYDROCHLORIDE AND EPINEPHRINE 10; 10 MG/ML; UG/ML
10 INJECTION, SOLUTION INFILTRATION; PERINEURAL ONCE
Status: COMPLETED | OUTPATIENT
Start: 2022-09-09 | End: 2022-09-09

## 2022-09-09 RX ADMIN — LIDOCAINE HYDROCHLORIDE AND EPINEPHRINE 10 ML: 10; 10 INJECTION, SOLUTION INFILTRATION; PERINEURAL at 13:28

## 2022-09-12 ENCOUNTER — ANCILLARY PROCEDURE (OUTPATIENT)
Dept: MAMMOGRAPHY | Facility: CLINIC | Age: 32
End: 2022-09-12
Attending: FAMILY MEDICINE
Payer: COMMERCIAL

## 2022-09-12 ENCOUNTER — TELEPHONE (OUTPATIENT)
Dept: MAMMOGRAPHY | Facility: CLINIC | Age: 32
End: 2022-09-12

## 2022-09-12 DIAGNOSIS — Z09 FOLLOW UP: ICD-10-CM

## 2022-09-12 LAB
PATH REPORT.COMMENTS IMP SPEC: NORMAL
PATH REPORT.COMMENTS IMP SPEC: NORMAL
PATH REPORT.FINAL DX SPEC: NORMAL
PATH REPORT.GROSS SPEC: NORMAL
PATH REPORT.MICROSCOPIC SPEC OTHER STN: NORMAL
PATH REPORT.RELEVANT HX SPEC: NORMAL
PHOTO IMAGE: NORMAL

## 2022-09-12 PROCEDURE — 76642 ULTRASOUND BREAST LIMITED: CPT | Mod: 52

## 2022-09-12 NOTE — RESULT ENCOUNTER NOTE
Dunia Ms. Martinez,  Your results came back and breast biopsy was normal. Sorry about the hematology Monitor for worsening. Continue with supportive care as discussed at the radiologist at breast clinic today.  If you have any further concerns please do not hesitate to contact us by message, phone or making an appointment.  Have a good day   Dr Franco DUKE

## 2022-09-12 NOTE — TELEPHONE ENCOUNTER
Spoke to Shantel this morning regarding a hematoma that developed on Saturday (9/10/22) in her left breast after her breast biopsy done Friday (9/9/22).  She stated that the hematoma is about the size of a nectarine, staying stable.  She stated that since Sunday she has noticed scant bleeding from her nipple.  The area is tender.  Denies the area being reddened, hot to touch or new fever.  Writer discussed symptoms with Breast Radiologist Fellow, Charly Gonzalez.  Shantel is scheduled today (9/12/22) at 10am to [facility].

## 2022-09-12 NOTE — TELEPHONE ENCOUNTER
Spoke to Shantel about the benign findings from her breast biopsy done last week.  We discussed the Radiologist's recommendation of starting yearly screening mammograms at age 40 unless she notices any new symptoms.  Shantel verbalized understanding and all questions and concerns were answered at this time.

## 2022-10-03 ENCOUNTER — HEALTH MAINTENANCE LETTER (OUTPATIENT)
Age: 32
End: 2022-10-03

## 2022-12-15 ENCOUNTER — PRE VISIT (OUTPATIENT)
Dept: MATERNAL FETAL MEDICINE | Facility: CLINIC | Age: 32
End: 2022-12-15

## 2022-12-21 NOTE — PROGRESS NOTES
"Maternal-Fetal Medicine Consultation    Shantel Martinez is a 32 year old female who is being evaluated via a billable telephone visit.       The patient has been notified of following:      \"This telephone visit will be conducted via a call between you and your physician/provider. We have found that certain health care needs can be provided without the need for a physical exam.  This service lets us provide the care you need with a short phone conversation.  If a prescription is necessary we can send it directly to your pharmacy.  If lab work is needed we can place an order for that and you can then stop by our lab to have the test done at a later time.     Telephone visits are billed at different rates depending on your insurance coverage. During this emergency period, for some insurers they may be billed the same as an in-person visit.  Please reach out to your insurance provider with any questions.     If during the course of the call the physician/provider feels a telephone visit is not appropriate, you will not be charged for this service.\"     Patient has given verbal consent for Telephone visit?  Yes     Phone call duration: 25 minutes    Shantel Martinez  : 1990  MRN: [id number]    REFERRAL:  Sahntel Martinez is a 32 year old sent by Dr. Gamez from [facility] for preconception consultation     HPI:  Shantel Martinez is a 32 year old here for [facility] preconception consultation for concerns regarding history of amniotic fluid embolism.    Patient's most recent delivery was 2021. She reports that she presented in spontaneous labor, but her labor eventually stalled. She underwent AROM for augmentation, and immediately following AROM had hypotension, tachycardia, hypoxia, lightheadedness and nausea. She was given a lot of fluids, and initially was symptomatically improved. However, she then proceeded to have another episode. A lung BSUS showed mild pulmonary edema, concerning for AFE. She was then " intubated and delivered via emergent PLTCS under GETA. She was extubated following delivery, and was monitored in the ICU overnight for 8 hours. She reports she felt well after delivery, and did not have further symptoms or vital sign changes. She had no evidence of DIC or coagulopathy. She reports she has not had similar episodes in her first pregnancy or outside of pregnancy. However, she does report she has low normal blood pressure and is very sensitive to fluctuations in her blood pressure. Patient's first delivery was in  and was uncomplicated.       Obstetrics History:  OB History    Para Term  AB Living   2 2 2 0 0 2   SAB IAB Ectopic Multiple Live Births   0 0 0 0 2      # Outcome Date GA Lbr Anand/2nd Weight Sex Delivery Anes PTL Lv   2 Term 21 38w0d  3.544 kg (7 lb 13 oz) M CS-Unspec   GATO      Name: DECLAN SARMIENTO      Apgar1: 9  Apgar5: 9   1 Term 20 39w1d / 00:27 3.232 kg (7 lb 2 oz) M Vag-Spont   GATO      Name: DECLAN SARMIENTO      Apgar1: 9  Apgar5: 9       Past Medical History:  Past Medical History:   Diagnosis Date     Amniotic fluid embolus, third trimester 2021     GBS (group B Streptococcus carrier), +RV culture, currently pregnant 2021     History of abnormal Pap smear 2014    Formatting of this note is different from the original. Date Procedure Result Plan  2013 Pap ASCUS HPV+   2014 Pap LSIL Colpo  2014 Colpo ECC negative Pap 1 year      (normal spontaneous vaginal delivery)        Past Surgical History:  Past Surgical History:   Procedure Laterality Date      SECTION      due toamniotic fluid embolism     COLPOSCOPY CERVIX, BIOPSY CERVIX, ENDOCERVICAL CURETTAGE, COMBINED       WISDOM TOOTH EXTRACTION         Current Medications:  Prior to Admission medications    Not on File       Allergies:  Bactrim [sulfamethoxazole w/trimethoprim]    ROS:  10-point ROS negative except as in HPI     PHYSICAL EXAM:  Deferred due to  phone consult     Latest Reference Range & Units 08/25/22 09:52   WBC 4.0 - 11.0 10e3/uL 4.0   Hemoglobin 11.7 - 15.7 g/dL 13.8   Hematocrit 35.0 - 47.0 % 42.2   Platelet Count 150 - 450 10e3/uL 261      Latest Reference Range & Units 08/25/22 09:52   Sodium 133 - 144 mmol/L 139   Potassium 3.4 - 5.3 mmol/L 4.4   Chloride 94 - 109 mmol/L 109   Carbon Dioxide 20 - 32 mmol/L 25   Urea Nitrogen 7 - 30 mg/dL 10   Creatinine 0.52 - 1.04 mg/dL 0.84   GFR Estimate >60 mL/min/1.73m2 >90   Calcium 8.5 - 10.1 mg/dL 8.8   Anion Gap 3 - 14 mmol/L 5   Albumin 3.4 - 5.0 g/dL 4.1   Protein Total 6.8 - 8.8 g/dL 7.9   Alkaline Phosphatase 40 - 150 U/L 90   ALT 0 - 50 U/L 20   AST 0 - 45 U/L 15   Bilirubin Total 0.2 - 1.3 mg/dL 1.1   Cholesterol <200 mg/dL 148   Patient Fasting > 8hrs?  Yes   HDL Cholesterol >=50 mg/dL 58   Hemoglobin A1C 0.0 - 5.6 % 5.3   LDL Cholesterol Calculated <=100 mg/dL 80   Non HDL Cholesterol <130 mg/dL 90   Triglycerides <150 mg/dL 49   TSH 0.40 - 4.00 mU/L 0.80     Evaluation from delivery 12/5/21  Normal chest XRAY  EKG    Normal sinus rhythm   Normal ECG   When compared with ECG of 05-DEC-2021 01:03,   Vent. rate has decreased BY  65 BPM   ST no longer depressed in Anterior leads        Assessment & Recommendations:  Shantel Martinez is a 32 year old here for [facility] preconception consultation for concerns regarding history of amniotic fluid embolism.    Reported history of an Amniotic Fluid Embolism  We discussed her prior hospital course at length. Overall, her history is not classic for an amniotic fluid embolism especially given the lack of coagulopathy during her course. We discussed that often in non-classic cases AFE is a diagnosis of exclusion and it is often impossible to know for certain  whether or not she had an AFE. Given AFEs are very rare with high rates of maternal mortality, we do not have any guidance regarding how to manage pregnancies in patients with a history of AFE. Reassuringly,  however, there have been case reports of patients who have had successful pregnancies following AFE, and there have not been any documented cases of recurrent AFE. Therefore, we suspect the risk of recurrence is extremely low. Given the possibility that it was not an AFE, recommend she obtain a maternal echocardiogram prior to pursuing pregnancy to rule out any underlying cardiac abnormalities that could have caused her symptoms. She recently had thyroid testing completed which was normal. Overall, she is healthy, and if her echo is normal, it would be reasonable for her to pursue pregnancy again in the future if she desires. We did also discuss PTSD/birth trauma that can be related to these events and that symptoms can develop during pregnancy. We discussed resources that are available if needed.     Recommendations:  - Recommend obtaining a maternal echocardiogram prior to pursuing pregnancy  - If echo is normal, ok to pursue pregnancy as patient desires. We would recommend routine prenatal care.  - Although extremely rare and no concern for potential recurrence, preparation and knowledge of management is prudent in the event another AFE is suspected at any point during her pregnancy/intrapartum course. [facility] has an excellent checklist for management (see below)      Patient seen and staffed with Dr. Munoz.    Jannet Rodriguez MD  OB/GYN Resident, PGY-2    MFM Attending Attestation  I have seen and evaluated the patient with Dr. Rodriguez. I reviewed her chart and agree with the above documented assessment and plan. I spent a total of 45 minutes on the date of this encounter including preparing to see the patient (reviewing medical records/tests), in direct face-to-face contact with the patient during her visit with the majority (>50%) spent counseling and discussing the plan of care, documenting the visit in the electronic medical record, and communicating with other health care professionals and/or care  coordination.Please see her note for specific details; I have made the necessary edits/additions.  Magaly Munoz MD  Maternal Fetal Medicine Physician

## 2022-12-22 ENCOUNTER — VIRTUAL VISIT (OUTPATIENT)
Dept: MATERNAL FETAL MEDICINE | Facility: CLINIC | Age: 32
End: 2022-12-22
Attending: OBSTETRICS & GYNECOLOGY
Payer: COMMERCIAL

## 2022-12-22 DIAGNOSIS — Z31.69 ENCOUNTER FOR PRECONCEPTION CONSULTATION: Primary | ICD-10-CM

## 2022-12-22 DIAGNOSIS — Z87.59 HISTORY OF AMNIOTIC FLUID EMBOLISM: ICD-10-CM

## 2022-12-22 PROCEDURE — 99204 OFFICE O/P NEW MOD 45 MIN: CPT | Mod: TEL | Performed by: OBSTETRICS & GYNECOLOGY

## 2022-12-22 NOTE — NURSING NOTE
Shantel is here for virtual preconception consult r/t hx amniotic fluid embolism. Dr. Munoz and Dr. Rodriguez to see pt. See note for details.

## 2023-04-17 ENCOUNTER — HOSPITAL ENCOUNTER (OUTPATIENT)
Dept: ULTRASOUND IMAGING | Facility: HOSPITAL | Age: 33
Discharge: HOME | End: 2023-04-17
Admitting: RADIOLOGY
Payer: COMMERCIAL

## 2023-04-17 ENCOUNTER — TELEPHONE (OUTPATIENT)
Dept: OBGYN | Facility: CLINIC | Age: 33
End: 2023-04-17
Payer: COMMERCIAL

## 2023-04-17 DIAGNOSIS — Z34.90 EARLY STAGE OF PREGNANCY: ICD-10-CM

## 2023-04-17 DIAGNOSIS — Z34.90 EARLY STAGE OF PREGNANCY: Primary | ICD-10-CM

## 2023-04-17 PROCEDURE — 76801 OB US < 14 WKS SINGLE FETUS: CPT

## 2023-04-17 NOTE — TELEPHONE ENCOUNTER
Not sure why already coming across as addendum?  Ultrasound ordered    Thanks  Cassie Faulkner MD

## 2023-04-17 NOTE — CONFIDENTIAL NOTE
Has not had NOB yet.    LMV 3/4 - 6w2d    Vaginal delivery with first -  with 2nd.    Way more cramping than other two preg.  No spotting or bleeding  Enough cramping to keep up at night  Localized to one side - left side  Has not tried tylenol  Drinking a lot of water.    Do you want to do labs or US?    Please advise.    Rachael Rondon RN

## 2023-04-18 NOTE — RESULT ENCOUNTER NOTE
Discussed with pt the need for HCG level.  Pt verbalized understanding.  Pt stated that her pain has gone away and that what is stated on the US dating on the US matches her LMP dating.  Pt states that she will come in tomorrow for her lab.

## 2023-04-19 ENCOUNTER — LAB (OUTPATIENT)
Dept: LAB | Facility: CLINIC | Age: 33
End: 2023-04-19
Payer: COMMERCIAL

## 2023-04-19 DIAGNOSIS — Z34.90 EARLY STAGE OF PREGNANCY: ICD-10-CM

## 2023-04-19 LAB — HCG INTACT+B SERPL-ACNC: 6163 MIU/ML

## 2023-04-19 PROCEDURE — 36415 COLL VENOUS BLD VENIPUNCTURE: CPT

## 2023-04-19 PROCEDURE — 84702 CHORIONIC GONADOTROPIN TEST: CPT

## 2023-04-24 ENCOUNTER — PRENATAL OFFICE VISIT (OUTPATIENT)
Dept: NURSING | Facility: CLINIC | Age: 33
End: 2023-04-24
Payer: COMMERCIAL

## 2023-04-24 ENCOUNTER — MYC MEDICAL ADVICE (OUTPATIENT)
Dept: NURSING | Facility: CLINIC | Age: 33
End: 2023-04-24

## 2023-04-24 ENCOUNTER — TRANSCRIBE ORDERS (OUTPATIENT)
Dept: MATERNAL FETAL MEDICINE | Facility: CLINIC | Age: 33
End: 2023-04-24

## 2023-04-24 VITALS — HEIGHT: 69 IN | BODY MASS INDEX: 20.82 KG/M2

## 2023-04-24 DIAGNOSIS — Z34.90 ENCOUNTER FOR SUPERVISION OF NORMAL PREGNANCY: Primary | ICD-10-CM

## 2023-04-24 DIAGNOSIS — O26.90 PREGNANCY RELATED CONDITION, ANTEPARTUM: Primary | ICD-10-CM

## 2023-04-24 DIAGNOSIS — Z23 NEED FOR TDAP VACCINATION: ICD-10-CM

## 2023-04-24 DIAGNOSIS — O21.9 NAUSEA AND VOMITING IN PREGNANCY: Primary | ICD-10-CM

## 2023-04-24 PROCEDURE — 99207 PR NO CHARGE NURSE ONLY: CPT

## 2023-04-24 RX ORDER — METOCLOPRAMIDE 10 MG/1
10 TABLET ORAL 4 TIMES DAILY PRN
Qty: 30 TABLET | Refills: 1 | Status: SHIPPED | OUTPATIENT
Start: 2023-04-24 | End: 2023-06-28

## 2023-04-24 NOTE — TELEPHONE ENCOUNTER
Patient is requesting Reglan to the updated pharmacy, third pregnancy    Samantha Garner LPN

## 2023-04-24 NOTE — PROGRESS NOTES
Important Information for Provider:     New ob nurse intake by phone, third pregnancy. History  of C section 12/05/2021. Sent Dr Aponte message to send Reglan to the updated pharmacy. Handouts reviewed. Ordered genetic screening  Patient has PCOS, irregular periods occur,40 to 55 days. Ultrasound was performed 4/17/2023, too early to detect viability. Repeat ultrasound scheduled for 5/03/2023  NOB with Dr Aponte 5/17/2023       Prenatal OB Questionnaire  Patient supplied answers from flow sheet for:  Prenatal OB Questionnaire.  Past Medical History  Have you ever received care for your mental health? : No  Have you ever been in a major accident or suffered serious trauma?: No  Within the last year, has anyone hit, slapped, kicked or otherwise hurt you?: No  In the last year, has anyone forced you to have sex when you didn't want to?: No    Past Medical History 2   Have you ever received a blood transfusion?: No  Would you accept a blood transfusion if was medically recommended?: Yes  Does anyone in your home smoke?: No   Is your blood type Rh negative?: No  Have you ever ?: No  Have you been hospitalized for a nonsurgical reason excluding normal delivery?: No  Have you ever had an abnormal pap smear?: No    Past Medical History (Continued)  Do you have a history of abnormalities of the uterus?: No  Did your mother take PHYLICIA or any other hormones when she was pregnant with you?: No  Do you have any other problems we have not asked about which you feel may be important to this pregnancy?: No                     Allergies as of 4/24/2023:    Allergies as of 04/24/2023 - Reviewed 04/24/2023   Allergen Reaction Noted     Bactrim [sulfamethoxazole w/trimethoprim] Other (See Comments) 07/01/2022       Current medications are:  Current Outpatient Medications   Medication Sig Dispense Refill     Prenatal Vit-DSS-Fe Cbn-FA (PRENATAL AD PO)            Early ultrasound screening tool:    Does patient have irregular  periods?  No  Did patient use hormonal birth control in the three months prior to positive urine pregnancy test? No  Is the patient breastfeeding?  No  Is the patient 10 weeks or greater at time of education visit?  No

## 2023-04-26 DIAGNOSIS — O21.9 NAUSEA AND VOMITING IN PREGNANCY: Primary | ICD-10-CM

## 2023-04-26 RX ORDER — ONDANSETRON 4 MG/1
4 TABLET, FILM COATED ORAL EVERY 8 HOURS PRN
Qty: 60 TABLET | Refills: 1 | Status: SHIPPED | OUTPATIENT
Start: 2023-04-26 | End: 2023-05-17

## 2023-04-26 RX ORDER — ONDANSETRON 4 MG/1
4 TABLET, FILM COATED ORAL EVERY 8 HOURS PRN
Qty: 60 TABLET | Refills: 1 | Status: CANCELLED | OUTPATIENT
Start: 2023-04-26

## 2023-04-26 NOTE — CONFIDENTIAL NOTE
6w4d    Patient had US on 4/17 - suggested repeat in 7-14 days.  Repeat US scheduled 5/3    NOB on 5/17    Patient having significant nausea and vomiting 3-4 times a day.  Reglan is not working.  Taking Vitamin B6 and Unisom    Wondering if she can take Zofran instead of Reglan.    Pended order.    Rachael Rondon, RN

## 2023-05-03 ENCOUNTER — HOSPITAL ENCOUNTER (OUTPATIENT)
Dept: ULTRASOUND IMAGING | Facility: HOSPITAL | Age: 33
Discharge: HOME | End: 2023-05-03
Attending: OBSTETRICS & GYNECOLOGY | Admitting: OBSTETRICS & GYNECOLOGY
Payer: COMMERCIAL

## 2023-05-03 DIAGNOSIS — Z34.90 EARLY STAGE OF PREGNANCY: ICD-10-CM

## 2023-05-03 PROCEDURE — 76801 OB US < 14 WKS SINGLE FETUS: CPT

## 2023-05-16 LAB
ABO/RH(D): NORMAL
ANTIBODY SCREEN: NEGATIVE
SPECIMEN EXPIRATION DATE: NORMAL

## 2023-05-17 ENCOUNTER — PRENATAL OFFICE VISIT (OUTPATIENT)
Dept: OBGYN | Facility: CLINIC | Age: 33
End: 2023-05-17
Payer: COMMERCIAL

## 2023-05-17 ENCOUNTER — MYC MEDICAL ADVICE (OUTPATIENT)
Dept: OBGYN | Facility: CLINIC | Age: 33
End: 2023-05-17

## 2023-05-17 VITALS
HEART RATE: 79 BPM | SYSTOLIC BLOOD PRESSURE: 106 MMHG | WEIGHT: 144 LBS | BODY MASS INDEX: 21.27 KG/M2 | DIASTOLIC BLOOD PRESSURE: 58 MMHG | OXYGEN SATURATION: 100 %

## 2023-05-17 DIAGNOSIS — Z34.81 ENCOUNTER FOR SUPERVISION OF OTHER NORMAL PREGNANCY IN FIRST TRIMESTER: Primary | ICD-10-CM

## 2023-05-17 DIAGNOSIS — Z87.59 HISTORY OF AMNIOTIC FLUID EMBOLISM: ICD-10-CM

## 2023-05-17 DIAGNOSIS — Z98.891 HISTORY OF CESAREAN SECTION: ICD-10-CM

## 2023-05-17 DIAGNOSIS — O21.9 NAUSEA AND VOMITING IN PREGNANCY: ICD-10-CM

## 2023-05-17 DIAGNOSIS — Z11.3 SCREEN FOR STD (SEXUALLY TRANSMITTED DISEASE): ICD-10-CM

## 2023-05-17 DIAGNOSIS — Z87.59 HISTORY OF AMNIOTIC FLUID EMBOLISM: Primary | ICD-10-CM

## 2023-05-17 LAB
ALBUMIN UR-MCNC: NEGATIVE MG/DL
APPEARANCE UR: CLEAR
BILIRUB UR QL STRIP: NEGATIVE
COLOR UR AUTO: YELLOW
ERYTHROCYTE [DISTWIDTH] IN BLOOD BY AUTOMATED COUNT: 14.2 % (ref 10–15)
GLUCOSE UR STRIP-MCNC: NEGATIVE MG/DL
HBV SURFACE AG SERPL QL IA: NONREACTIVE
HCT VFR BLD AUTO: 37.9 % (ref 35–47)
HCV AB SERPL QL IA: NONREACTIVE
HGB BLD-MCNC: 12.4 G/DL (ref 11.7–15.7)
HGB UR QL STRIP: NEGATIVE
HIV 1+2 AB+HIV1 P24 AG SERPL QL IA: NONREACTIVE
KETONES UR STRIP-MCNC: ABNORMAL MG/DL
LEUKOCYTE ESTERASE UR QL STRIP: NEGATIVE
MCH RBC QN AUTO: 28.4 PG (ref 26.5–33)
MCHC RBC AUTO-ENTMCNC: 32.7 G/DL (ref 31.5–36.5)
MCV RBC AUTO: 87 FL (ref 78–100)
NITRATE UR QL: NEGATIVE
PH UR STRIP: 6 [PH] (ref 5–7)
PLATELET # BLD AUTO: 249 10E3/UL (ref 150–450)
RBC # BLD AUTO: 4.36 10E6/UL (ref 3.8–5.2)
SP GR UR STRIP: >=1.03 (ref 1–1.03)
UROBILINOGEN UR STRIP-ACNC: 0.2 E.U./DL
WBC # BLD AUTO: 8.3 10E3/UL (ref 4–11)

## 2023-05-17 PROCEDURE — 86900 BLOOD TYPING SEROLOGIC ABO: CPT | Performed by: OBSTETRICS & GYNECOLOGY

## 2023-05-17 PROCEDURE — 86850 RBC ANTIBODY SCREEN: CPT | Performed by: OBSTETRICS & GYNECOLOGY

## 2023-05-17 PROCEDURE — 85027 COMPLETE CBC AUTOMATED: CPT | Performed by: OBSTETRICS & GYNECOLOGY

## 2023-05-17 PROCEDURE — 87340 HEPATITIS B SURFACE AG IA: CPT | Performed by: OBSTETRICS & GYNECOLOGY

## 2023-05-17 PROCEDURE — 86803 HEPATITIS C AB TEST: CPT | Performed by: OBSTETRICS & GYNECOLOGY

## 2023-05-17 PROCEDURE — 87086 URINE CULTURE/COLONY COUNT: CPT | Performed by: OBSTETRICS & GYNECOLOGY

## 2023-05-17 PROCEDURE — 86780 TREPONEMA PALLIDUM: CPT | Performed by: OBSTETRICS & GYNECOLOGY

## 2023-05-17 PROCEDURE — 87389 HIV-1 AG W/HIV-1&-2 AB AG IA: CPT | Performed by: OBSTETRICS & GYNECOLOGY

## 2023-05-17 PROCEDURE — 87591 N.GONORRHOEAE DNA AMP PROB: CPT | Performed by: OBSTETRICS & GYNECOLOGY

## 2023-05-17 PROCEDURE — 99203 OFFICE O/P NEW LOW 30 MIN: CPT | Performed by: OBSTETRICS & GYNECOLOGY

## 2023-05-17 PROCEDURE — 86901 BLOOD TYPING SEROLOGIC RH(D): CPT | Performed by: OBSTETRICS & GYNECOLOGY

## 2023-05-17 PROCEDURE — 81003 URINALYSIS AUTO W/O SCOPE: CPT | Performed by: OBSTETRICS & GYNECOLOGY

## 2023-05-17 PROCEDURE — 87491 CHLMYD TRACH DNA AMP PROBE: CPT | Performed by: OBSTETRICS & GYNECOLOGY

## 2023-05-17 PROCEDURE — 86762 RUBELLA ANTIBODY: CPT | Performed by: OBSTETRICS & GYNECOLOGY

## 2023-05-17 PROCEDURE — 36415 COLL VENOUS BLD VENIPUNCTURE: CPT | Performed by: OBSTETRICS & GYNECOLOGY

## 2023-05-17 RX ORDER — ONDANSETRON 4 MG/1
4 TABLET, FILM COATED ORAL EVERY 8 HOURS PRN
Qty: 10 TABLET | Refills: 0 | Status: SHIPPED | OUTPATIENT
Start: 2023-05-17 | End: 2023-06-28

## 2023-05-17 NOTE — PROGRESS NOTES
"[facility]- OBGYN    Estimated Date of Delivery: Dec 16, 2023  SUBJECTIVE:     HPI:    Shantel Martinez is a 33 year old  at 9w4d by 5w2d us who presents today for her first OB visit.  Patient reports she is feeling ok.  Is tired and nauseated. Overall nausea has improved.  She went from vomiting 4x per day to less than once per week with zofran treatment.  She had some pelvic cramping early, but none recent.  Denies any bleeding, abnormal vaginal discharge, dysuria, fever, chills, chest pain, chest pressure, headache, diarrhea, or edema.  Has some constipation, but it is getting better.      Patient and her  are certain this will be their last baby.  She states they plan either tubal or vasectomy pending route of delivery.  A third pregnancy was in their plan, but it happened sooner than they expected.    Patient has a history of traumatic birth with her last pregnancy.  Please see [facility] preconception consultation note from 22 for details.    \"Patient's most recent delivery was 2021. She reports that she presented in spontaneous labor, but her labor eventually stalled. She underwent AROM for augmentation, and immediately following AROM had hypotension, tachycardia, hypoxia, lightheadedness and nausea. She was given a lot of fluids, and initially was symptomatically improved. However, she then proceeded to have another episode. A lung BSUS showed mild pulmonary edema, concerning for AFE. She was then intubated and delivered via emergent PLTCS under GETA. She was extubated following delivery, and was monitored in the ICU overnight for 8 hours. She reports she felt well after delivery, and did not have further symptoms or vital sign changes. She had no evidence of DIC or coagulopathy. She reports she has not had similar episodes in her first pregnancy or outside of pregnancy. However, she does report she has low normal blood pressure and is very sensitive to fluctuations in her blood " "pressure. Patient's first delivery was in 2019 and was uncomplicated. \"    [facility] recommendations:  - Recommend obtaining a maternal echocardiogram prior to pursuing pregnancy  - If echo is normal, ok to pursue pregnancy as patient desires. We would recommend routine prenatal care.  - Although extremely rare and no concern for potential recurrence, preparation and knowledge of management is prudent in the event another AFE is suspected at any point during her pregnancy/intrapartum course. [facility] has an excellent checklist for management (see below)      OBGYN Hx:   OB History    Para Term  AB Living   3 2 2 0 0 2   SAB IAB Ectopic Multiple Live Births   0 0 0 0 2      # Outcome Date GA Lbr Anand/2nd Weight Sex Delivery Anes PTL Lv   3 Current            2 Term 21 38w0d  3.544 kg (7 lb 13 oz) M CS-Unspec   GATO      Name: DECLAN SARMIENTO      Apgar1: 9  Apgar5: 9   1 Term 20 39w1d / 00:27 3.232 kg (7 lb 2 oz) M Vag-Spont   GATO      Name: DECLAN SARMIENTO      Apgar1: 9  Apgar5: 9       Patient's last menstrual period was 2023.  Menses: irregular, PCOS  STD Hx:none  Pap hx:22 NILM    PMH:   Past Medical History:   Diagnosis Date     Amniotic fluid embolus, third trimester 2021     Carrier of group B Streptococcus      GBS (group B Streptococcus carrier), +RV culture, currently pregnant 2021     History of abnormal Pap smear 2014    Formatting of this note is different from the original. Date Procedure Result Plan  2013 Pap ASCUS HPV+   2014 Pap LSIL Colpo  2014 Colpo ECC negative Pap 1 year     PCOS (polycystic ovarian syndrome)      Postpartum depression      Varicella        PSH:   Past Surgical History:   Procedure Laterality Date      SECTION      due toamniotic fluid embolism     COLPOSCOPY CERVIX, BIOPSY CERVIX, ENDOCERVICAL CURETTAGE, COMBINED       WISDOM TOOTH EXTRACTION         Meds: zofran, PNV  Allergies:   Allergies   Allergen Reactions     " Bactrim [Sulfamethoxazole-Trimethoprim] Other (See Comments)     Throat swelling       SH: Denies any tobacco, alcohol, or drug use.  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  Family History   Problem Relation Age of Onset     Prostate Cancer Father 55         OBJECTIVE:     O: Patient Vitals for the past 24 hrs:   BP Pulse SpO2 Weight   23 0906 106/58 79 100 % 65.3 kg (144 lb)   ]  Exam:  GENERAL: healthy, alert and no distress  EYES: Eyes grossly normal to inspection, sclerae normal  NECK: no adenopathy, no asymmetry, masses, or scars and thyroid normal to palpation  RESP: lungs clear to auscultation  CV: regular rate and rhythm  ABDOMEN: soft, nontender  MS: no gross musculoskeletal defects noted, no edema  SKIN: no suspicious lesions or rashes  NEURO: Normal strength and tone, mentation intact and speech normal  PSYCH: mentation appears normal, affect normal/bright    BSUS: single viable IUP.  Cardiac activity visualized     ASSESSMENT/PLAN:     Shantel Martinez is a 33 year old  at 9w4d by 5w2d us who presents today for her first OB visit.     (Z34.81) Encounter for supervision of other normal pregnancy in first trimester  (primary encounter diagnosis)  Comment: Reviewed with patient Becky for delivery location, call schedule, clinic visit schedule, and team structure including participation of medical students and residents in her care.  Discussed first prenatal labs to be collected today.  IUP with cardiac activity visualized on BSUS.  Patient plans this to be her last baby.  Wants permanent sterilization.  Recommended weight gain in pregnancy of 25-35 lbs.   Plan: Next visit in 4-6 weeks    (O21.9) Nausea and vomiting in pregnancy  Comment: Patient with well treated nausea and vomiting in pregnancy.  Will refill zofran  Plan: ondansetron (ZOFRAN) 4 MG tablet    (Z11.3) Screen for STD (sexually  transmitted disease)  Comment: Routine screen  Plan: NEISSERIA GONORRHOEA PCR, CHLAMYDIA TRACHOMATIS        PCR    (Z87.59) History of amniotic fluid embolism  Comment:  Reviewed MFM preconception consult.  I do not see ECHO in patient's chart.  Message sent to patient following visit to ask if this was done at a different location.  Discussed patient's experience with her prior delivery.  Patient is working with a therapist that specializes in birth trauma and she feels this is helpful.   Plan: if patient has not had ECHO, will ask MFM if she needs one in pregnancy or not.     (Z98.891) History of  section  Comment: Patient with prior , STAT during last delivery.  Discussed option for TOLAC vs. Scheduled .   Plan: will continue to address mode of delivery throughout pregnancy.      Megan Aponte MD

## 2023-05-18 LAB
BACTERIA UR CULT: NO GROWTH
C TRACH DNA SPEC QL NAA+PROBE: NEGATIVE
N GONORRHOEA DNA SPEC QL NAA+PROBE: NEGATIVE
RUBV IGG SERPL QL IA: 5.09 INDEX
RUBV IGG SERPL QL IA: POSITIVE
T PALLIDUM AB SER QL: NONREACTIVE

## 2023-05-25 ENCOUNTER — PRE VISIT (OUTPATIENT)
Dept: MATERNAL FETAL MEDICINE | Facility: HOSPITAL | Age: 33
End: 2023-05-25
Payer: COMMERCIAL

## 2023-05-31 ENCOUNTER — OFFICE VISIT (OUTPATIENT)
Dept: MATERNAL FETAL MEDICINE | Facility: HOSPITAL | Age: 33
End: 2023-05-31
Attending: OBSTETRICS & GYNECOLOGY
Payer: COMMERCIAL

## 2023-05-31 ENCOUNTER — LAB (OUTPATIENT)
Dept: LAB | Facility: HOSPITAL | Age: 33
End: 2023-05-31
Attending: OBSTETRICS & GYNECOLOGY
Payer: COMMERCIAL

## 2023-05-31 ENCOUNTER — ANCILLARY PROCEDURE (OUTPATIENT)
Dept: ULTRASOUND IMAGING | Facility: HOSPITAL | Age: 33
End: 2023-05-31
Attending: OBSTETRICS & GYNECOLOGY
Payer: COMMERCIAL

## 2023-05-31 DIAGNOSIS — O26.90 PREGNANCY RELATED CONDITION, ANTEPARTUM: ICD-10-CM

## 2023-05-31 DIAGNOSIS — Z36.9 UNSPECIFIED ANTENATAL SCREENING: ICD-10-CM

## 2023-05-31 DIAGNOSIS — Z31.5 ENCOUNTER FOR PROCREATIVE GENETIC COUNSELING AND TESTING: Primary | ICD-10-CM

## 2023-05-31 DIAGNOSIS — Z31.5 ENCOUNTER FOR PROCREATIVE GENETIC COUNSELING AND TESTING: ICD-10-CM

## 2023-05-31 DIAGNOSIS — Z36.82 NUCHAL TRANSLUCENCY OF FETUS ON PRENATAL ULTRASOUND: Primary | ICD-10-CM

## 2023-05-31 PROCEDURE — G0463 HOSPITAL OUTPT CLINIC VISIT: HCPCS | Performed by: STUDENT IN AN ORGANIZED HEALTH CARE EDUCATION/TRAINING PROGRAM

## 2023-05-31 PROCEDURE — 96040 HC GENETIC COUNSELING, EACH 30 MINUTES: CPT | Performed by: GENETIC COUNSELOR, MS

## 2023-05-31 PROCEDURE — 99202 OFFICE O/P NEW SF 15 MIN: CPT | Mod: 25 | Performed by: STUDENT IN AN ORGANIZED HEALTH CARE EDUCATION/TRAINING PROGRAM

## 2023-05-31 PROCEDURE — 76813 OB US NUCHAL MEAS 1 GEST: CPT

## 2023-05-31 PROCEDURE — 76813 OB US NUCHAL MEAS 1 GEST: CPT | Mod: 26 | Performed by: STUDENT IN AN ORGANIZED HEALTH CARE EDUCATION/TRAINING PROGRAM

## 2023-05-31 PROCEDURE — 36415 COLL VENOUS BLD VENIPUNCTURE: CPT

## 2023-05-31 NOTE — PROGRESS NOTES
Please see the full imaging report from the [employer] program under the imaging tab.    Marianne Almanza MD  Maternal Fetal Medicine

## 2023-05-31 NOTE — PROGRESS NOTES
[facility] Fetal [facility]  Genetic Counseling Consult    Patient:  Shantel Martinez YOB: 1990   Date of Service:  23   MRN: [id number]    Shantel was seen at the [facility] for genetic consultation. The indication for genetic counseling is desire to discuss options for genetic screening and diagnostics. The patient was unaccompanied to this visit.     The session was conducted in English.      IMPRESSION/ PLAN   1. Shantel has not had genetic screening in this pregnancy but elected to have screening today.     2. During today's [facility] visit, Shantel had a blood draw for NIPS through [facility]. The NIPS screens for trisomy 21, 18, and 13 and the patient opted to screen for sex chromosome aneuploidies, including reported fetal sex. Results are expected in 7-10 days. The patient will be called with results and if they do not answer they requested a detailed message with results on their voicemail, NOT including the predicted fetal sex information. Instead, they plan to learn predicted fetal sex from results in [employer]. Patient was informed that results, including fetal sex, will be available in [employer].    3. Shantel had a nuchal translucency ultrasound today. Please see the ultrasound report for further details.    4. Further recommendations include a fetal anatomy ultrasound around 18-20 weeks, which will most likely be completed with their primary OB provider, and maternal serum AFP only screening for neural tube defects, if desired (quad screen should NOT be performed).     5.  Shantel has been seen in [facility] previously for a preconception consult.  A maternal echocardiogram was recommended at that time and orders are in Shantel's chart for this care.  She was encouraged to coordinate this evaluation at her convenience.    PREGNANCY HISTORY   /Parity:       Shantel's pregnancy history is significant for:     2 prior full term  "pregnancies. Shantel's second pregnancy was complicated at delivery by an amniotic fluid embolism.  Please see corresponding documentation from 22 for details and care recommendations.  A maternal echocardiogram was recommended as a part of that care and Shantel was encouraged to follow up with scheduling to coordinate.    CURRENT PREGNANCY   Current Age: 33 year old   Age at Delivery: 33 year old  HELDER: 2023, by Ultrasound                                   Gestational Age: 11w4d  This pregnancy is a single gestation.   This pregnancy was conceived spontaneously.    MEDICAL HISTORY   Shantel s reported medical history is not expected to impact pregnancy management or risks to fetal development.       FAMILY HISTORY   A three-generation pedigree was obtained today and is scanned under the \"Media\" tab in Epic. The family history was reported by Shantel.    The reported family history is unremarkable for multiple miscarriages, stillbirths, birth defects, intellectual disabilities, seizures/epilepsy, hearing or vision loss in childhood, known genetic conditions, and consanguinity.       CARRIER SCREENING   Expanded carrier screening is available to screen for autosomal recessive conditions and X-linked conditions in a large list of genes. Autosomal recessive conditions happen when a mutation has been inherited from the egg and sperm and include conditions like cystic fibrosis, thalassemia, hearing loss, spinal muscular atrophy, and more. X-linked conditions happen when a mutation has been inherited from the egg and include conditions like fragile X syndrome.  screening was also reviewed. About [place]  Screening        The patient believes she had carrier screening for at least cystic fibrosis and potentially more conditions as a part of care in her first pregnancy. Records of this testing are not available for review today.  We discussed that more broad carrier screening is available than was offered 4 " years ago.  Shantel was not certain about whether to pursue carrier screening today. They will contact us if they would like to pursue screening.        RISK ASSESSMENT FOR CHROMOSOME CONDITIONS   We explained that the risk for fetal chromosome abnormalities increases with maternal age. We discussed specific features of common chromosome abnormalities, including Down syndrome, trisomy 13, trisomy 18, and sex chromosome trisomies.      At age 33 at midtrimester, the risk to have a baby with Down syndrome is 1 in 508.     At age 33 at midtrimester, the risk to have a baby with any chromosome abnormality is 1 in 254.     Shantel has not had genetic screening in this pregnancy but elected to have screening today.      GENETIC TESTING OPTIONS   Genetic testing during a pregnancy includes screening and diagnostic procedures.      Screening tests are non-invasive which means no risk to the pregnancy and includes ultrasounds and blood work. The benefits and limitations of screening were reviewed. Screening tests provide a risk assessment (chance) specific to the pregnancy for certain fetal chromosome abnormalities but cannot definitively diagnose or exclude a fetal chromosome abnormality. Follow-up genetic counseling and consideration of diagnostic testing is recommended with any abnormal screening result. Diagnostic testing during a pregnancy is more certain and can test for more conditions. However, the tests do have a risk of miscarriage that requires careful consideration. These tests can detect fetal chromosome abnormalities with greater than 99% certainty. Results can be compromised by maternal cell contamination or mosaicism and are limited by the resolution of current genetic testing technology.     There is no screening or diagnostic test that detects all forms of birth defects or intellectual disability.     We discussed the following screening options:   First trimester screening    Ultrasound between [id number]  that includes nuchal translucency measurement and nasal bone assessments    Nuchal translucency refers to the space at the back of the neck where fluid builds up. All babies at this stage have fluid and there is only concern if there is too much fluid    Nasal bone refers to the small bone in the nose. There is concern for conditions like Down syndrome if the bone cannot be seen at all    Blood work from arm for levels of hCG and BRIAN-A    Screens for trisomy 21 and trisomy 18    Cannot screen for open neural tube defects, maternal serum AFP after 15 weeks is recommended    Non-invasive prenatal testing (NIPT)    Also called cell-free DNA screening because it detects chromosomes from the placenta in the pregnant person's blood    Can be done any time after 10 weeks gestation    Screens for trisomy 21, trisomy 18, trisomy 13, and sex chromosome aneuploidies    Cannot screen for open neural tube defects, maternal serum AFP after 15 weeks is recommended      We discussed the following ultrasound options:  Nuchal translucency (NT) ultrasound    Ultrasound between [id number] that includes nuchal translucency measurement and nasal bone assessments    Nuchal translucency refers to the space at the back of the neck where fluid builds up. All babies at this stage have fluid and there is only concern if there is too much fluid    Nasal bone refers to the small bone in the nose. There is concern for conditions like Down syndrome if the bone cannot be seen at all    This ultrasound can be done as part of first trimester screening, at the same time as another screen (NIPT), at the same time as a CVS, or if the patients does not want genetic screening.    Markers on ultrasound detects about 70% of pregnancies with aneuploidy    Abnormalities on NT ultrasound can also increase the risk for a birth defect, like a heart defect    Comprehensive level II ultrasound (Fetal Anatomy Ultrasound)    Ultrasound done between 18-20 weeks  gestation    Screens for major birth defects and markers for aneuploidy (like trisomy 21 and trisomy 18)    Includes looking at the fetus/baby's growth, heart, organs (stomach, kidneys), placenta, and amniotic fluid      We discussed the following diagnostic options:   Chorionic villus sampling (CVS)    Invasive diagnostic procedure done between 10w0d and 13w6d    The procedure collects a small sample from the placenta for the purpose of chromosomal testing and/or other genetic testing    Diagnostic result; more than 99% sensitivity for fetal chromosome abnormalities    Cannot screen for open neural tube defects, maternal serum AFP after 15 weeks is recommended    Amniocentesis    Invasive diagnostic procedure done after 15 weeks gestation    The procedure collects a small sample of amniotic fluid for the purpose of chromosomal testing and/or other genetic testing    Diagnostic result; more than 99% sensitivity for fetal chromosome abnormalities    Testing for AFP in the amniotic fluid can test for open neural tube defects        It was a pleasure to be involved with [facility]. Face-to-face time of the meeting was 40 minutes.    Leonid Villar, UVALDO, MS, [facility]  Certified and [place] Licensed Genetic Counselor  [facility]  Maternal Fetal Medicine  Office: [phone]  [facility]: [phone]   Fax: [phone]  [facility]

## 2023-05-31 NOTE — NURSING NOTE
Shantel Martinez is a  at 11w4d who presents to [facility] for GC and NT ultrasound. Pt denies bldg/lof/change in discharge, contractions, headache, vision changes, chest pain/SOB or edema. SBAR given to Dr. Almanza, see note in Epic.

## 2023-06-05 ENCOUNTER — TELEPHONE (OUTPATIENT)
Dept: MATERNAL FETAL MEDICINE | Facility: CLINIC | Age: 33
End: 2023-06-05
Payer: COMMERCIAL

## 2023-06-05 LAB — SCANNED LAB RESULT: NORMAL

## 2023-06-05 NOTE — TELEPHONE ENCOUNTER
6/5/2023       Called Shantel to discuss NIPT results.  Results came back negative for chromosome abnormalities in chromosomes 21, 18, & 13, as well as the sex chromosomes.  These test results do not definitively rule out the possibility of one of these conditions, but they do greatly reduce the likelihood. Shantel declined to have predicted fetal sex disclosed at this time and plans to review this information in [employer] later today.  Shantel had no questions at this time and was encouraged to reach out if she has any questions or concerns in the future.       Leonid Villar MS, [facility]  Licensed Genetic Counselor  Phone: [phone]  Pager: [phone]

## 2023-06-14 ENCOUNTER — HOSPITAL ENCOUNTER (OUTPATIENT)
Dept: CARDIOLOGY | Facility: CLINIC | Age: 33
Discharge: HOME | End: 2023-06-14
Attending: OBSTETRICS & GYNECOLOGY | Admitting: OBSTETRICS & GYNECOLOGY
Payer: COMMERCIAL

## 2023-06-14 DIAGNOSIS — Z87.59 HISTORY OF AMNIOTIC FLUID EMBOLISM: ICD-10-CM

## 2023-06-14 LAB — LVEF ECHO: NORMAL

## 2023-06-14 PROCEDURE — 93306 TTE W/DOPPLER COMPLETE: CPT

## 2023-06-28 ENCOUNTER — PRENATAL OFFICE VISIT (OUTPATIENT)
Dept: OBGYN | Facility: CLINIC | Age: 33
End: 2023-06-28
Payer: COMMERCIAL

## 2023-06-28 VITALS
DIASTOLIC BLOOD PRESSURE: 58 MMHG | HEART RATE: 87 BPM | OXYGEN SATURATION: 99 % | WEIGHT: 150.7 LBS | SYSTOLIC BLOOD PRESSURE: 97 MMHG | BODY MASS INDEX: 22.25 KG/M2

## 2023-06-28 DIAGNOSIS — B37.31 YEAST INFECTION OF THE VAGINA: ICD-10-CM

## 2023-06-28 DIAGNOSIS — N89.8 VAGINAL DISCHARGE: ICD-10-CM

## 2023-06-28 DIAGNOSIS — Z34.82 ENCOUNTER FOR SUPERVISION OF OTHER NORMAL PREGNANCY, SECOND TRIMESTER: Primary | ICD-10-CM

## 2023-06-28 LAB
CLUE CELLS: ABNORMAL
TRICHOMONAS, WET PREP: ABNORMAL
WBC'S/HIGH POWER FIELD, WET PREP: ABNORMAL
YEAST, WET PREP: PRESENT

## 2023-06-28 PROCEDURE — 36415 COLL VENOUS BLD VENIPUNCTURE: CPT | Performed by: OBSTETRICS & GYNECOLOGY

## 2023-06-28 PROCEDURE — 99207 PR PRENATAL VISIT: CPT | Performed by: OBSTETRICS & GYNECOLOGY

## 2023-06-28 PROCEDURE — 82105 ALPHA-FETOPROTEIN SERUM: CPT | Mod: 90 | Performed by: OBSTETRICS & GYNECOLOGY

## 2023-06-28 PROCEDURE — 99000 SPECIMEN HANDLING OFFICE-LAB: CPT | Performed by: OBSTETRICS & GYNECOLOGY

## 2023-06-28 PROCEDURE — 87210 SMEAR WET MOUNT SALINE/INK: CPT | Performed by: OBSTETRICS & GYNECOLOGY

## 2023-06-28 RX ORDER — FLUCONAZOLE 150 MG/1
150 TABLET ORAL ONCE
Qty: 1 TABLET | Refills: 0 | Status: SHIPPED | OUTPATIENT
Start: 2023-06-28 | End: 2023-06-28

## 2023-06-28 NOTE — PATIENT INSTRUCTIONS
Pregnancy: Your Second Trimester Changes  Each day, you and your baby are changing and growing together. Here s a quick look at what s happening to both of you.  How you are changing  Even when you don t notice it, your body is adapting to meet the needs of your growing baby. The changes in your body might also affect your moods.  Your body  Your uterus expands as your baby grows. As the weeks go by, you will feel more pressure on your bladder, stomach, and other organs. You may notice some skin color changes on your forehead, nose, or cheeks. Freckles may darken, and moles may grow. You may notice a darker line on your abdomen between your belly button and pubic bone in the midline.  Your moods  The second trimester is often easier than the first. Still, be prepared for mood swings. These are from the increase in hormones made by your body. Hormones are chemicals that affect the way organs work. These mood swings are a normal part of pregnancy.  How your baby is growing    Month 4  Your baby s heartbeat may be heard with a Doppler (handheld ultrasound device) by 9 to 10 weeks. Eyebrows, eyelashes, and fingernails begin to form.    Month 5  You may feel your baby move. After a growth spurt, your baby nears 10 inches.    Month 6  Your baby s fingerprints have formed. Your baby weighs about 1 to 2 pounds and is about 12 inches long.    [employer] last reviewed this educational content on 7/1/2021 2000-2022 [employer]. All rights reserved. This information is not intended as a substitute for professional medical care. Always follow your healthcare professional's instructions.          Adapting to Pregnancy: Second Trimester  Keep up the healthy habits you started in your first trimester. You might be a little more tired than normal. So plan your day wisely. Look at the tips below and choose the ones that suit your lifestyle.   Note  If you have any questions, talk with your healthcare provider.   If  you work  If you can, adjust your work with your employer to fit your needs. Try these tips:     If you stand for long periods, find ways to do some tasks while sitting. Also, try to stand with 1 foot resting on a low stool or ledge. Shift your weight from foot to foot often. Wear low-heeled shoes.    If you sit, keep your knees level with your hips. Rest your feet on a firm surface. Sit tall with support for your low back.    If you work long hours, ask about adjusting your schedule. Try taking shorter breaks more often.  When you travel  The second trimester may be the best time for any travel. Talk to your healthcare provider about any special plans you may need to make. Always:     Wear a seat belt. Fasten the lap part under your belly. Wear the shoulder part also.    Take breaks often during long trips by car or plane. Move around to stretch your legs.    Drink plenty of fluids on flights. The air in plane cabins is very dry.    Stay out of hot climates or high altitudes if you are not used to them.    Stay away from places where the food and water might make you sick.    Make sure you are up-to-date on all vaccines, including the flu vaccine. This is especially important when traveling overseas.  Taking time to relax  Find time to rest and relax at work or at home:     Take short time-outs daily. Do relaxation exercises.    Breathe deeply during stressful times.    Try not to take on too much. Plan tasks for times when you have the most energy.    Take naps when you can. Or just sit and relax.    After week 16, don't lie on your back for more than a few minutes. Instead, lie on your side. Switch sides often.    Having sex  Unless your healthcare provider tells you otherwise, there is no reason to stop having sex now. Blood supply increases to the pelvic area in the second trimester. Because of this, sex might be more enjoyable. Try different positions and see what s best. Also talk with your partner about any  changes in desire. Spotting may happen after sex. Let your healthcare provider know if there is heavy bleeding.   Keeping your environment safe  You can still clean your house and use scented products. Just take some simple precautions:     Wear gloves when using cleaning fluids.    Open windows to let in fresh air. Use a fan if you paint.    Stay away from secondhand smoke.    Don t breathe fumes from nail polish, hair spray, cleansers, or other chemicals.  How daily issues affect your health  Many things in your daily life impact your health. This can include transportation, money problems, housing, access to food, and . If you can t get to medical appointments, you may not receive the care you need. When money is tight, it may be difficult to pay for medicines. And living far from a grocery store can make it hard to buy healthy food.   If you have concerns in any of these or other areas, talk with your healthcare team. They may know of local resources to assist you. Or they may have a staff person who can help.   [employer] last reviewed this educational content on 6/1/2021 2000-2022 [employer]. All rights reserved. This information is not intended as a substitute for professional medical care. Always follow your healthcare professional's instructions.

## 2023-06-28 NOTE — PROGRESS NOTES
15w4d overall feeling ok, symptoms much improved from first tri.  Not feeling mvmt yet.  Had echo and wnl. She has decided she desires repeat c/s, possibly BS at same time.  Still in discussion with her  about more children, although she does not want more.  Discussed plan for scheduling at 39w, earlier if indications arise.  She has gone in to labor at 38 and 39w with first two, really hoping to avoid labor if possible.    Has no overt vaginal/vulva symptoms, but unable to have intercourse due to extreme discomfort and irritation of the vulva and vagina.  Wet prep: yeast.  rx for diflucan faxed with instructions.    Afp today.  Will order level 2 RTC 4 weeks  dax

## 2023-06-29 ENCOUNTER — TRANSCRIBE ORDERS (OUTPATIENT)
Dept: MATERNAL FETAL MEDICINE | Facility: CLINIC | Age: 33
End: 2023-06-29
Payer: COMMERCIAL

## 2023-06-29 DIAGNOSIS — O26.90 PREGNANCY RELATED CONDITION, ANTEPARTUM: Primary | ICD-10-CM

## 2023-06-30 LAB
# FETUSES US: NORMAL
AFP MOM SERPL: 1.18
AFP SERPL-MCNC: 38 NG/ML
AGE - REPORTED: 32.8 YR
CURRENT SMOKER: NO
FAMILY MEMBER DISEASES HX: NO
GA METHOD: NORMAL
GA: NORMAL WK
IDDM PATIENT QL: NO
INTEGRATED SCN PATIENT-IMP: NORMAL
SPECIMEN DRAWN SERPL: NORMAL

## 2023-07-19 ENCOUNTER — OFFICE VISIT (OUTPATIENT)
Dept: MATERNAL FETAL MEDICINE | Facility: HOSPITAL | Age: 33
End: 2023-07-19
Attending: OBSTETRICS & GYNECOLOGY
Payer: COMMERCIAL

## 2023-07-19 ENCOUNTER — ANCILLARY PROCEDURE (OUTPATIENT)
Dept: ULTRASOUND IMAGING | Facility: HOSPITAL | Age: 33
End: 2023-07-19
Attending: OBSTETRICS & GYNECOLOGY
Payer: COMMERCIAL

## 2023-07-19 DIAGNOSIS — O26.90 PREGNANCY RELATED CONDITION, ANTEPARTUM: Primary | ICD-10-CM

## 2023-07-19 DIAGNOSIS — O26.90 PREGNANCY RELATED CONDITION, ANTEPARTUM: ICD-10-CM

## 2023-07-19 PROCEDURE — 76811 OB US DETAILED SNGL FETUS: CPT

## 2023-07-19 PROCEDURE — 76811 OB US DETAILED SNGL FETUS: CPT | Mod: 26 | Performed by: OBSTETRICS & GYNECOLOGY

## 2023-07-19 PROCEDURE — 99212 OFFICE O/P EST SF 10 MIN: CPT | Mod: 25 | Performed by: OBSTETRICS & GYNECOLOGY

## 2023-07-19 NOTE — NURSING NOTE
Shantel Martinez is a  at 18w4d who presents to [facility] for a comprehensive ultrasound.  SBAR given to Dr. Briceno, see note in Epic.     Kina Toribio RN

## 2023-07-19 NOTE — PROGRESS NOTES
"Please see \"Imaging\" tab under \"Chart Review\" for details of today's visit.    Matt Briceno    "

## 2023-07-26 ENCOUNTER — PRENATAL OFFICE VISIT (OUTPATIENT)
Dept: OBGYN | Facility: CLINIC | Age: 33
End: 2023-07-26
Payer: COMMERCIAL

## 2023-07-26 VITALS
WEIGHT: 154.5 LBS | DIASTOLIC BLOOD PRESSURE: 56 MMHG | HEART RATE: 79 BPM | SYSTOLIC BLOOD PRESSURE: 94 MMHG | BODY MASS INDEX: 22.82 KG/M2

## 2023-07-26 DIAGNOSIS — Z34.82 ENCOUNTER FOR SUPERVISION OF OTHER NORMAL PREGNANCY, SECOND TRIMESTER: Primary | ICD-10-CM

## 2023-07-26 PROCEDURE — 99207 PR PRENATAL VISIT: CPT | Performed by: OBSTETRICS & GYNECOLOGY

## 2023-07-26 NOTE — PROGRESS NOTES
19w4d feeling good, no c/o.  Starting to feel some mvmt.  Had normal level 2, no further mgmt reccs from [facility].  Glucola next visit.  RTC 4-5 wks  amrikp

## 2023-08-01 ENCOUNTER — TELEPHONE (OUTPATIENT)
Dept: OBGYN | Facility: CLINIC | Age: 33
End: 2023-08-01
Payer: COMMERCIAL

## 2023-08-01 NOTE — TELEPHONE ENCOUNTER
That sounds reasonable.  Can do OTC decongestants/cold meds prn.  Might be worth checking another covid test just to be sure, but there is definitely a long term URI going around. Thanks    DIONISIO GARZA MD

## 2023-08-01 NOTE — TELEPHONE ENCOUNTER
20w3d    Patient calling, states she and her kids have been sick with a viral illness for about 12-14 days, says she has been feeling better lately but over the last 24 hours has had an increase in chills, sinus pressure, coughing. She is able to eat and drink as much as she normally would. Did take an at home covid test when she first got sick about 2 weeks ago and it was negative.     RN recommending pushing fluids and tylenol to help with the fever. Discussed precautions to go to the emergency department if she feels weak/lethargic, cannot eat or drink.     Routing to provider for further advise, should patient be directed to urgent care as this has been going on for awhile?    Appreciated, COLLIN Schwab

## 2023-09-07 ENCOUNTER — PRENATAL OFFICE VISIT (OUTPATIENT)
Dept: OBGYN | Facility: CLINIC | Age: 33
End: 2023-09-07
Payer: COMMERCIAL

## 2023-09-07 ENCOUNTER — LAB (OUTPATIENT)
Dept: LAB | Facility: CLINIC | Age: 33
End: 2023-09-07
Payer: COMMERCIAL

## 2023-09-07 VITALS
SYSTOLIC BLOOD PRESSURE: 107 MMHG | BODY MASS INDEX: 23.98 KG/M2 | DIASTOLIC BLOOD PRESSURE: 65 MMHG | OXYGEN SATURATION: 99 % | HEART RATE: 82 BPM | WEIGHT: 162.4 LBS

## 2023-09-07 DIAGNOSIS — Z34.82 ENCOUNTER FOR SUPERVISION OF OTHER NORMAL PREGNANCY, SECOND TRIMESTER: ICD-10-CM

## 2023-09-07 DIAGNOSIS — D64.9 LOW HEMOGLOBIN: ICD-10-CM

## 2023-09-07 DIAGNOSIS — Z34.82 ENCOUNTER FOR SUPERVISION OF OTHER NORMAL PREGNANCY, SECOND TRIMESTER: Primary | ICD-10-CM

## 2023-09-07 DIAGNOSIS — O34.219 PREVIOUS CESAREAN DELIVERY, ANTEPARTUM CONDITION OR COMPLICATION: Primary | ICD-10-CM

## 2023-09-07 LAB
ERYTHROCYTE [DISTWIDTH] IN BLOOD BY AUTOMATED COUNT: 14 % (ref 10–15)
GLUCOSE 1H P 50 G GLC PO SERPL-MCNC: 176 MG/DL (ref 70–129)
HCT VFR BLD AUTO: 35.4 % (ref 35–47)
HGB BLD-MCNC: 10.4 G/DL (ref 11.7–15.7)
HGB BLD-MCNC: 11.5 G/DL (ref 11.7–15.7)
MCH RBC QN AUTO: 29 PG (ref 26.5–33)
MCHC RBC AUTO-ENTMCNC: 32.5 G/DL (ref 31.5–36.5)
MCV RBC AUTO: 89 FL (ref 78–100)
PLATELET # BLD AUTO: 238 10E3/UL (ref 150–450)
RBC # BLD AUTO: 3.96 10E6/UL (ref 3.8–5.2)
WBC # BLD AUTO: 6.9 10E3/UL (ref 4–11)

## 2023-09-07 PROCEDURE — 85027 COMPLETE CBC AUTOMATED: CPT

## 2023-09-07 PROCEDURE — 83540 ASSAY OF IRON: CPT

## 2023-09-07 PROCEDURE — 99207 PR PRENATAL VISIT: CPT | Performed by: OBSTETRICS & GYNECOLOGY

## 2023-09-07 PROCEDURE — 83550 IRON BINDING TEST: CPT

## 2023-09-07 PROCEDURE — 36415 COLL VENOUS BLD VENIPUNCTURE: CPT | Performed by: OBSTETRICS & GYNECOLOGY

## 2023-09-07 PROCEDURE — 82950 GLUCOSE TEST: CPT | Performed by: OBSTETRICS & GYNECOLOGY

## 2023-09-07 PROCEDURE — 82728 ASSAY OF FERRITIN: CPT

## 2023-09-07 NOTE — PROGRESS NOTES
25w5d feeling good, no c/o.  Lots of mvmt.  Would like to schedule c/s if possible, discussed timing.  She is not going to do BS,  is still hoping for another, but will do vasectomy when ready.  Glucola today.  Tdap next visit.  RTC 4 weeks  jlp

## 2023-09-08 ENCOUNTER — MYC MEDICAL ADVICE (OUTPATIENT)
Dept: OBGYN | Facility: CLINIC | Age: 33
End: 2023-09-08

## 2023-09-08 ENCOUNTER — TELEPHONE (OUTPATIENT)
Dept: OBGYN | Facility: CLINIC | Age: 33
End: 2023-09-08

## 2023-09-08 DIAGNOSIS — R73.09 ELEVATED GLUCOSE: Primary | ICD-10-CM

## 2023-09-08 LAB
FERRITIN SERPL-MCNC: 19 NG/ML (ref 6–175)
IRON BINDING CAPACITY (ROCHE): 379 UG/DL (ref 240–430)
IRON SATN MFR SERPL: 8 % (ref 15–46)
IRON SERPL-MCNC: 30 UG/DL (ref 37–145)

## 2023-09-08 NOTE — TELEPHONE ENCOUNTER
Type of surgery: ob  Location of surgery: [facility]  Date and time of surgery: 12/12/2023 10:30AM  Surgeon: Dr. Kina Jeter  Pre-Op Appt Date: surgeon  Post-Op Appt Date: 6 weeks, waiting for Jan schedules   Packet sent out: Yes  Pre-cert/Authorization completed:  Not Applicable  Date: 09/08/2023

## 2023-09-13 ENCOUNTER — LAB (OUTPATIENT)
Dept: LAB | Facility: CLINIC | Age: 33
End: 2023-09-13
Payer: COMMERCIAL

## 2023-09-13 DIAGNOSIS — R73.09 ELEVATED GLUCOSE: ICD-10-CM

## 2023-09-13 LAB
GESTATIONAL GTT 1 HR POST DOSE: 71 MG/DL (ref 60–179)
GESTATIONAL GTT 2 HR POST DOSE: 101 MG/DL (ref 60–154)
GESTATIONAL GTT 3 HR POST DOSE: 94 MG/DL (ref 60–139)
GLUCOSE P FAST SERPL-MCNC: 70 MG/DL (ref 60–94)

## 2023-09-13 PROCEDURE — 82952 GTT-ADDED SAMPLES: CPT

## 2023-09-13 PROCEDURE — 36415 COLL VENOUS BLD VENIPUNCTURE: CPT

## 2023-09-13 PROCEDURE — 82951 GLUCOSE TOLERANCE TEST (GTT): CPT

## 2023-10-03 ENCOUNTER — PRENATAL OFFICE VISIT (OUTPATIENT)
Dept: OBGYN | Facility: CLINIC | Age: 33
End: 2023-10-03
Payer: COMMERCIAL

## 2023-10-03 VITALS
BODY MASS INDEX: 24.84 KG/M2 | OXYGEN SATURATION: 98 % | HEART RATE: 92 BPM | TEMPERATURE: 97 F | DIASTOLIC BLOOD PRESSURE: 58 MMHG | WEIGHT: 168.2 LBS | SYSTOLIC BLOOD PRESSURE: 98 MMHG

## 2023-10-03 DIAGNOSIS — O34.219 PREVIOUS CESAREAN DELIVERY, ANTEPARTUM CONDITION OR COMPLICATION: Primary | ICD-10-CM

## 2023-10-03 PROCEDURE — 99207 PR PRENATAL VISIT: CPT | Performed by: OBSTETRICS & GYNECOLOGY

## 2023-10-03 NOTE — PROGRESS NOTES
29w3d feeling good, tired.  They just moved, so things are busy and chaotic.  Good fm.  Is planning to hold of Tdap today, likely next visit.  May do flu through work, but will let me know if she wants it at visit here.  RTC 2-3 weeks  dax

## 2023-10-22 ENCOUNTER — HEALTH MAINTENANCE LETTER (OUTPATIENT)
Age: 33
End: 2023-10-22

## 2023-11-02 ENCOUNTER — PRENATAL OFFICE VISIT (OUTPATIENT)
Dept: OBGYN | Facility: CLINIC | Age: 33
End: 2023-11-02
Payer: COMMERCIAL

## 2023-11-02 VITALS
HEART RATE: 100 BPM | DIASTOLIC BLOOD PRESSURE: 60 MMHG | TEMPERATURE: 98.6 F | OXYGEN SATURATION: 97 % | BODY MASS INDEX: 24.82 KG/M2 | SYSTOLIC BLOOD PRESSURE: 107 MMHG | WEIGHT: 167.6 LBS | HEIGHT: 69 IN

## 2023-11-02 DIAGNOSIS — Z23 HIGH PRIORITY FOR 2019-NCOV VACCINE: ICD-10-CM

## 2023-11-02 DIAGNOSIS — Z23 NEED FOR TDAP VACCINATION: ICD-10-CM

## 2023-11-02 DIAGNOSIS — Z23 NEED FOR PROPHYLACTIC VACCINATION AND INOCULATION AGAINST INFLUENZA: ICD-10-CM

## 2023-11-02 DIAGNOSIS — O34.219 PREVIOUS CESAREAN DELIVERY, ANTEPARTUM CONDITION OR COMPLICATION: Primary | ICD-10-CM

## 2023-11-02 PROCEDURE — 99207 PR PRENATAL VISIT: CPT | Performed by: OBSTETRICS & GYNECOLOGY

## 2023-11-02 PROCEDURE — 90471 IMMUNIZATION ADMIN: CPT | Performed by: OBSTETRICS & GYNECOLOGY

## 2023-11-02 PROCEDURE — 90715 TDAP VACCINE 7 YRS/> IM: CPT | Performed by: OBSTETRICS & GYNECOLOGY

## 2023-11-02 PROCEDURE — 90686 IIV4 VACC NO PRSV 0.5 ML IM: CPT | Performed by: OBSTETRICS & GYNECOLOGY

## 2023-11-02 PROCEDURE — 91320 SARSCV2 VAC 30MCG TRS-SUC IM: CPT | Performed by: OBSTETRICS & GYNECOLOGY

## 2023-11-02 PROCEDURE — 90472 IMMUNIZATION ADMIN EACH ADD: CPT | Performed by: OBSTETRICS & GYNECOLOGY

## 2023-11-02 PROCEDURE — 90480 ADMN SARSCOV2 VAC 1/ONLY CMP: CPT | Performed by: OBSTETRICS & GYNECOLOGY

## 2023-11-02 ASSESSMENT — PATIENT HEALTH QUESTIONNAIRE - PHQ9: SUM OF ALL RESPONSES TO PHQ QUESTIONS 1-9: 0

## 2023-11-02 NOTE — PROGRESS NOTES
33w5d feeling ok, getting more uncomfortable.  More pelvic pressure and discomfort.  Good fm.  Has moved, not totally settled yet but getting there.  Tdap today.  RSV is out, so will RTC next week for RN only visit to get that.  RTC 2 weeks  jlp

## 2023-11-09 ENCOUNTER — ALLIED HEALTH/NURSE VISIT (OUTPATIENT)
Dept: OBGYN | Facility: CLINIC | Age: 33
End: 2023-11-09
Payer: COMMERCIAL

## 2023-11-09 DIAGNOSIS — Z29.11 NEED FOR RSV IMMUNIZATION: Primary | ICD-10-CM

## 2023-11-09 PROCEDURE — 90678 RSV VACC PREF BIVALENT IM: CPT | Performed by: OBSTETRICS & GYNECOLOGY

## 2023-11-09 PROCEDURE — 90471 IMMUNIZATION ADMIN: CPT | Performed by: OBSTETRICS & GYNECOLOGY

## 2023-11-09 PROCEDURE — 99207 PR NO CHARGE NURSE ONLY: CPT

## 2023-11-10 ENCOUNTER — TELEPHONE (OUTPATIENT)
Dept: OBGYN | Facility: CLINIC | Age: 33
End: 2023-11-10
Payer: COMMERCIAL

## 2023-11-10 NOTE — TELEPHONE ENCOUNTER
PAPERWORK REQUEST    Form received on: 11/08/2023  How was form received: Fax  Preferred Provider: Kina Jeter MD  Type of form: [facility]  Date needed by: ASAP  What to do with form once completed: Please fax to [phone]  Where was form placed?: Provider basket  Has an CELSO been signed? Not Applicable    Additional Notes:

## 2023-11-22 ENCOUNTER — PRENATAL OFFICE VISIT (OUTPATIENT)
Dept: OBGYN | Facility: CLINIC | Age: 33
End: 2023-11-22
Payer: COMMERCIAL

## 2023-11-22 VITALS
DIASTOLIC BLOOD PRESSURE: 65 MMHG | BODY MASS INDEX: 24.94 KG/M2 | HEART RATE: 95 BPM | WEIGHT: 168.9 LBS | OXYGEN SATURATION: 98 % | SYSTOLIC BLOOD PRESSURE: 97 MMHG

## 2023-11-22 DIAGNOSIS — O34.219 PREVIOUS CESAREAN DELIVERY, ANTEPARTUM: Primary | ICD-10-CM

## 2023-11-22 LAB
HGB BLD-MCNC: 11 G/DL (ref 11.7–15.7)
HOLD SPECIMEN: NORMAL

## 2023-11-22 PROCEDURE — 36415 COLL VENOUS BLD VENIPUNCTURE: CPT | Performed by: OBSTETRICS & GYNECOLOGY

## 2023-11-22 PROCEDURE — 87653 STREP B DNA AMP PROBE: CPT | Performed by: OBSTETRICS & GYNECOLOGY

## 2023-11-22 PROCEDURE — 99207 PR PRENATAL VISIT: CPT | Performed by: OBSTETRICS & GYNECOLOGY

## 2023-11-22 NOTE — PROGRESS NOTES
36w4d feeling ok, but definitely more uncomfortable, some ctx at night and overall feels like she may go in to labor before her scheduled c/s.  Discussed would just do c/s at that time if she did labor, etc.  She may also consider TOLAC if she presents and is far along in her labor.  GBS today.  Got RSV last week.  RTC weekly  dax

## 2023-11-23 LAB — GP B STREP DNA SPEC QL NAA+PROBE: NEGATIVE

## 2023-11-24 ENCOUNTER — HOSPITAL ENCOUNTER (OUTPATIENT)
Facility: CLINIC | Age: 33
Discharge: HOME | End: 2023-11-24
Attending: OBSTETRICS & GYNECOLOGY | Admitting: OBSTETRICS & GYNECOLOGY
Payer: COMMERCIAL

## 2023-11-24 VITALS — TEMPERATURE: 97.8 F | RESPIRATION RATE: 16 BRPM | DIASTOLIC BLOOD PRESSURE: 70 MMHG | SYSTOLIC BLOOD PRESSURE: 111 MMHG

## 2023-11-24 PROCEDURE — 999N000105 HC STATISTIC NO DOCUMENTATION TO SUPPORT CHARGE

## 2023-11-24 PROCEDURE — 59025 FETAL NON-STRESS TEST: CPT | Mod: 26 | Performed by: OBSTETRICS & GYNECOLOGY

## 2023-11-24 PROCEDURE — 59025 FETAL NON-STRESS TEST: CPT

## 2023-11-24 ASSESSMENT — ACTIVITIES OF DAILY LIVING (ADL): ADLS_ACUITY_SCORE: 20

## 2023-11-25 NOTE — PROGRESS NOTES
[facility]  OB Triage Assessment Note    HPI:  Shantel Sarmiento is a 33 year old  at 36w6d by 5w2d US who presents for pelvic pressure and contractions. She reports that contractions started at 1400 today. They have increased in frequency and are more uncomfortable. Now q5-10 minutes. She denies loss of fluids, vaginal bleeding and reports normal fetal movement. No recent abdominal trauma.      Pregnancy Complications:  - H/o CS x1  - H/o AFE  - H/o PPD    Prenatal Labs:   Lab Results   Component Value Date    AS Negative 2023    HEPBANG Nonreactive 2023    CHPCRT Negative 2023    GCPCRT Negative 2023    HGB 11.0 (L) 2023     OB History  OB History    Para Term  AB Living   3 2 2 0 0 2   SAB IAB Ectopic Multiple Live Births   0 0 0 0 2      # Outcome Date GA Lbr Anand/2nd Weight Sex Delivery Anes PTL Lv   3 Current            2 Term 21 38w0d  3.544 kg (7 lb 13 oz) M CS-Unspec   GATO      Name: DECLAN SARMIENTO      Apgar1: 9  Apgar5: 9   1 Term 20 39w1d / 00:27 3.232 kg (7 lb 2 oz) M Vag-Spont   GATO      Name: DECLAN SARMIENTO      Apgar1: 9  Apgar5: 9     PMHx:  Past Medical History:   Diagnosis Date    Amniotic fluid embolus, third trimester 2021    Carrier of group B Streptococcus     GBS (group B Streptococcus carrier), +RV culture, currently pregnant 2021    History of abnormal Pap smear 2014    Formatting of this note is different from the original. Date Procedure Result Plan  2013 Pap ASCUS HPV+   2014 Pap LSIL Colpo  2014 Colpo ECC negative Pap 1 year    PCOS (polycystic ovarian syndrome)     Postpartum depression     Varicella      PSHx:   Past Surgical History:   Procedure Laterality Date     SECTION      due toamniotic fluid embolism    COLPOSCOPY CERVIX, BIOPSY CERVIX, ENDOCERVICAL CURETTAGE, COMBINED      WISDOM TOOTH EXTRACTION       Meds:   Medications Prior to Admission    Medication Sig Dispense Refill Last Dose    Prenatal Vit-DSS-Fe Cbn-FA (PRENATAL AD PO)    2023     Allergies:    Allergies   Allergen Reactions    Bactrim [Sulfamethoxazole-Trimethoprim] Other (See Comments)     Throat swelling      FmHx:   Family History   Problem Relation Age of Onset    Prostate Cancer Father 55     ROS:   Negative except as noted in HPI.   PE:  Vit: Patient Vitals for the past 4 hrs:   BP Temp Temp src Resp   23 111/70 97.8  F (36.6  C) Oral 16      Gen: Well-appearing, NAD, comfortable   CV: Regular rate and well perfused  Pulm: Breathing comfortably on RA  Abd: Soft, gravid, non-tender    SVE: 0.5-1/30-40/-3    NST  FHT: Baseline 140, moderate variability, + accelerations, no decelerations   Hornitos: 1 contractions in 10 minutes      Assessment  Ms. Shantel Martinez is a 33 year old , at 36w6d by 5w3d US presenting with pelvic pressure and contractions. Pregnancy notable for H/o CS x1, h/o AFE, h/o PPD     Plan    # R/o PTL   Reporting 1 contraction q5-10 minutes. On toco ~ 1 contraction/10 minutes. Patient pausing to breath with contractions. Otherwise, no LOF or vaginal bleeding. SVE 0.5-1/30-40/-3.   - Discussed options including monitoring for labor with repeat SVE in 1-2 hours vs discharge to home with strict return precautions. She elects to discharge to home but will re-present should she experience an increase in contractions/pain, loss of fluid, vaginal bleeding or decreased fetal movement.   - Discussed importance of PO hydration   - Vistaril offered for therapeutic rest, patient declined at this time  - Discussed delivery planning, hoping for scheduled repeat CS ()     # PNC  - Rh pos, Rubella immune, GBS neg     # FWB  NST reactive and reassuring    Discussed with Dr Joey Love MD  Ob/Gyn PGY-2  23 9:21 PM      Physician Attestation   I, Alka Cook, reviewed this patient with the resident and agree with the resident's findings  and plan of care as documented in the note.      I personally reviewed vital signs, labs and fetal heart tones and toco.    Key findings: patient is here with contractions, possible early labor, vs B-H ctx's. She is overall mildly uncomfortable with infrequent contractions.  Fetal status is reassuring with a reactive NST.  She was stable for discharge home and will come back if her ctx's increase, SROM or bleeding.     Alka Cook MD  Date of Service (when I saw the patient): 11/24/23

## 2023-11-29 ENCOUNTER — PRENATAL OFFICE VISIT (OUTPATIENT)
Dept: OBGYN | Facility: CLINIC | Age: 33
End: 2023-11-29
Payer: COMMERCIAL

## 2023-11-29 VITALS
HEART RATE: 86 BPM | SYSTOLIC BLOOD PRESSURE: 114 MMHG | HEIGHT: 69 IN | WEIGHT: 173.2 LBS | OXYGEN SATURATION: 99 % | DIASTOLIC BLOOD PRESSURE: 69 MMHG | TEMPERATURE: 97.2 F | BODY MASS INDEX: 25.65 KG/M2

## 2023-11-29 DIAGNOSIS — O34.219 PREVIOUS CESAREAN DELIVERY, ANTEPARTUM: Primary | ICD-10-CM

## 2023-11-29 PROCEDURE — 99207 PR PRENATAL VISIT: CPT | Performed by: OBSTETRICS & GYNECOLOGY

## 2023-11-29 NOTE — PROGRESS NOTES
37w4d overall feeling ok, more pressure now and inconsistent BH ctx, but nothing regular or painful.  Seen on L&D last week for pressure and BH ctx that were regular, but mild, and was FT dilated, better since then.  Good fm.  Just hoping to make it to the 12th.  RTC weekly  amrikp

## 2023-12-05 DIAGNOSIS — O34.219 PREVIOUS CESAREAN DELIVERY, ANTEPARTUM: ICD-10-CM

## 2023-12-05 DIAGNOSIS — Z01.818 PRE-OP EXAM: Primary | ICD-10-CM

## 2023-12-07 ENCOUNTER — PRENATAL OFFICE VISIT (OUTPATIENT)
Dept: OBGYN | Facility: CLINIC | Age: 33
End: 2023-12-07
Payer: COMMERCIAL

## 2023-12-07 VITALS
WEIGHT: 173.2 LBS | HEIGHT: 69 IN | BODY MASS INDEX: 25.65 KG/M2 | HEART RATE: 94 BPM | DIASTOLIC BLOOD PRESSURE: 70 MMHG | OXYGEN SATURATION: 97 % | SYSTOLIC BLOOD PRESSURE: 128 MMHG | TEMPERATURE: 97.8 F

## 2023-12-07 DIAGNOSIS — O34.219 PREVIOUS CESAREAN DELIVERY, ANTEPARTUM CONDITION OR COMPLICATION: Primary | ICD-10-CM

## 2023-12-07 PROCEDURE — 99207 PR PRENATAL VISIT: CPT | Performed by: OBSTETRICS & GYNECOLOGY

## 2023-12-07 NOTE — PROGRESS NOTES
38w5d feeling good, no c/o.  Good fm.  Discussed timeline of day of surgery and hospital stay.  Questions answered.  C/s scheduled 12/12.  dax

## 2023-12-10 ENCOUNTER — ANESTHESIA EVENT (OUTPATIENT)
Dept: OBGYN | Facility: CLINIC | Age: 33
End: 2023-12-10
Payer: COMMERCIAL

## 2023-12-10 LAB
ABO/RH(D): NORMAL
ANTIBODY SCREEN: NEGATIVE
SPECIMEN EXPIRATION DATE: NORMAL

## 2023-12-11 ENCOUNTER — LAB (OUTPATIENT)
Dept: LAB | Facility: CLINIC | Age: 33
End: 2023-12-11
Attending: OBSTETRICS & GYNECOLOGY
Payer: COMMERCIAL

## 2023-12-11 DIAGNOSIS — O34.219 PREVIOUS CESAREAN DELIVERY, ANTEPARTUM: ICD-10-CM

## 2023-12-11 DIAGNOSIS — Z01.818 PRE-OP EXAM: ICD-10-CM

## 2023-12-11 LAB
ERYTHROCYTE [DISTWIDTH] IN BLOOD BY AUTOMATED COUNT: 16.1 % (ref 10–15)
HCT VFR BLD AUTO: 33.8 % (ref 35–47)
HGB BLD-MCNC: 10.9 G/DL (ref 11.7–15.7)
MCH RBC QN AUTO: 28.9 PG (ref 26.5–33)
MCHC RBC AUTO-ENTMCNC: 32.2 G/DL (ref 31.5–36.5)
MCV RBC AUTO: 90 FL (ref 78–100)
PLATELET # BLD AUTO: 211 10E3/UL (ref 150–450)
RBC # BLD AUTO: 3.77 10E6/UL (ref 3.8–5.2)
T PALLIDUM AB SER QL: NONREACTIVE
WBC # BLD AUTO: 11.2 10E3/UL (ref 4–11)

## 2023-12-11 PROCEDURE — 36415 COLL VENOUS BLD VENIPUNCTURE: CPT

## 2023-12-11 PROCEDURE — 85027 COMPLETE CBC AUTOMATED: CPT

## 2023-12-11 PROCEDURE — 86850 RBC ANTIBODY SCREEN: CPT

## 2023-12-11 PROCEDURE — 86780 TREPONEMA PALLIDUM: CPT

## 2023-12-11 PROCEDURE — 87635 SARS-COV-2 COVID-19 AMP PRB: CPT

## 2023-12-11 PROCEDURE — 86901 BLOOD TYPING SEROLOGIC RH(D): CPT

## 2023-12-11 PROCEDURE — 86900 BLOOD TYPING SEROLOGIC ABO: CPT

## 2023-12-11 NOTE — ANESTHESIA PREPROCEDURE EVALUATION
Anesthesia Pre-Procedure Evaluation    Patient: Shantel Martinez   MRN: [id number] : 1990        Procedure : Procedure(s):   SECTION          Past Medical History:   Diagnosis Date    Amniotic fluid embolus, third trimester 2021    Carrier of group B Streptococcus     GBS (group B Streptococcus carrier), +RV culture, currently pregnant 2021    History of abnormal Pap smear 2014    Formatting of this note is different from the original. Date Procedure Result Plan  2013 Pap ASCUS HPV+   2014 Pap LSIL Colpo  2014 Colpo ECC negative Pap 1 year    PCOS (polycystic ovarian syndrome)     Postpartum depression     Varicella       Past Surgical History:   Procedure Laterality Date     SECTION      due toamniotic fluid embolism    COLPOSCOPY CERVIX, BIOPSY CERVIX, ENDOCERVICAL CURETTAGE, COMBINED      WISDOM TOOTH EXTRACTION        Allergies   Allergen Reactions    Bactrim [Sulfamethoxazole-Trimethoprim] Other (See Comments)     Throat swelling      Social History     Tobacco Use    Smoking status: Never    Smokeless tobacco: Never   Substance Use Topics    Alcohol use: Not Currently     Comment: occasional      Wt Readings from Last 1 Encounters:   23 78.6 kg (173 lb 3.2 oz)        Anesthesia Evaluation   Pt has had prior anesthetic. Type: General and Regional.    No history of anesthetic complications       ROS/MED HX  ENT/Pulmonary:  - neg pulmonary ROS  (-) asthma and recent URI   Neurologic:  - neg neurologic ROS  (-) no seizures and no CVA   Cardiovascular: Comment: METS >4    (+)  - -   -  - -                                 Previous cardiac testing   Echo: Date: 23 Results:  EF 60-65% w/o valvular abnormalities  Stress Test:  Date: Results:    ECG Reviewed:  Date: Results:    Cath:  Date: Results:   (-) taking anticoagulants/antiplatelets, MERINO and arrhythmias   METS/Exercise Tolerance: >4 METS    Hematologic:     (+)      anemia,       (-) history of blood  "clots   Musculoskeletal:  - neg musculoskeletal ROS     GI/Hepatic:  - neg GI/hepatic ROS  (-) GERD   Renal/Genitourinary:  - neg Renal ROS     Endo:  - neg endo ROS  (-) thyroid disease   Psychiatric/Substance Use:  - neg psychiatric ROS     Infectious Disease:  - neg infectious disease ROS     Malignancy:  - neg malignancy ROS     Other:      (+)  , ,previous  (Prior possible AFE during last pregnancy  requiring GETA)         Physical Exam    Airway        Mallampati: I   TM distance: > 3 FB   Neck ROM: full   Mouth opening: > 3 cm    Respiratory Devices and Support         Dental       (+) Minor Abnormalities - some fillings, tiny chips      Cardiovascular   cardiovascular exam normal       Rhythm and rate: regular and normal     Pulmonary   pulmonary exam normal        breath sounds clear to auscultation           OUTSIDE LABS:  CBC:   Lab Results   Component Value Date    WBC 11.2 (H) 2023    WBC 6.9 2023    HGB 10.9 (L) 2023    HGB 11.0 (L) 2023    HCT 33.8 (L) 2023    HCT 35.4 2023     2023     2023     BMP:   Lab Results   Component Value Date     2022    POTASSIUM 4.4 2022    CHLORIDE 109 2022    CO2 25 2022    BUN 10 2022    CR 0.84 2022    GLC 94 2022     COAGS: No results found for: \"PTT\", \"INR\", \"FIBR\"  POC: No results found for: \"BGM\", \"HCG\", \"HCGS\"  HEPATIC:   Lab Results   Component Value Date    ALBUMIN 4.1 2022    PROTTOTAL 7.9 2022    ALT 20 2022    AST 15 2022    ALKPHOS 90 2022    BILITOTAL 1.1 2022     OTHER:   Lab Results   Component Value Date    A1C 5.3 2022    IZABEL 8.8 2022    TSH 0.80 2022       Anesthesia Plan    ASA Status:  2    NPO Status:  ELEVATED Aspiration Risk/Unknown    Anesthesia Type: Spinal.   Induction: N/a.   Maintenance: N/A.   Techniques and Equipment:       - Drips/Meds: Phenylephrine "     Consents    Anesthesia Plan(s) and associated risks, benefits, and realistic alternatives discussed. Questions answered and patient/representative(s) expressed understanding.     - Discussed: Risks, Benefits and Alternatives for BOTH SEDATION and the PROCEDURE were discussed     - Discussed with:  Patient       - Patient is DNR/DNI Status: No     Use of blood products discussed: Yes.     - Discussed with: Patient.     - Consented: consented to blood products     Postoperative Care    Pain management: Multi-modal analgesia, intrathecal morphine, Oral pain medications, IV analgesics, Peripheral nerve block (Single Shot).   PONV prophylaxis: Ondansetron (or other 5HT-3), Dexamethasone or Solumedrol     Comments:    Other Comments: 34 yo female  at 39w3d presenting for repeat  delivery, previous x 1 for presumed AFE. Based upon chart review, patient was never hemodynamically unstable (did c/o some dyspnea prior to event) and underwent emergent  delivery under general anesthesia. She was intubated with usual induction agents and extubated at the end of the  delivery without complications and never developed coagulopathy or DIC, was observed in ICU for 8 hours post-op. Maternal echo completed during this pregnancy WNL with EF 60-65%. Plan spinal with standard ASA monitors and post op single shot bilateral transversus abdominal plane block.           Emma Nguyen MD    I have reviewed the pertinent notes and labs in the chart from the past 30 days and (re)examined the patient.  Any updates or changes from those notes are reflected in this note.

## 2023-12-12 ENCOUNTER — HOSPITAL ENCOUNTER (INPATIENT)
Facility: CLINIC | Age: 33
LOS: 2 days | Discharge: HOME | End: 2023-12-14
Attending: OBSTETRICS & GYNECOLOGY | Admitting: OBSTETRICS & GYNECOLOGY
Payer: COMMERCIAL

## 2023-12-12 ENCOUNTER — ANESTHESIA (OUTPATIENT)
Dept: OBGYN | Facility: CLINIC | Age: 33
End: 2023-12-12
Payer: COMMERCIAL

## 2023-12-12 DIAGNOSIS — Z98.891 S/P CESAREAN SECTION: ICD-10-CM

## 2023-12-12 LAB — SARS-COV-2 RNA RESP QL NAA+PROBE: NEGATIVE

## 2023-12-12 PROCEDURE — 250N000011 HC RX IP 250 OP 636

## 2023-12-12 PROCEDURE — 250N000009 HC RX 250

## 2023-12-12 PROCEDURE — 250N000011 HC RX IP 250 OP 636: Mod: JZ

## 2023-12-12 PROCEDURE — C9290 INJ, BUPIVACAINE LIPOSOME: HCPCS

## 2023-12-12 PROCEDURE — 258N000003 HC RX IP 258 OP 636: Performed by: OBSTETRICS & GYNECOLOGY

## 2023-12-12 PROCEDURE — 271N000001 HC OR GENERAL SUPPLY NON-STERILE: Performed by: OBSTETRICS & GYNECOLOGY

## 2023-12-12 PROCEDURE — 250N000011 HC RX IP 250 OP 636: Performed by: OBSTETRICS & GYNECOLOGY

## 2023-12-12 PROCEDURE — 250N000011 HC RX IP 250 OP 636: Performed by: STUDENT IN AN ORGANIZED HEALTH CARE EDUCATION/TRAINING PROGRAM

## 2023-12-12 PROCEDURE — 59510 CESAREAN DELIVERY: CPT | Mod: GC | Performed by: OBSTETRICS & GYNECOLOGY

## 2023-12-12 PROCEDURE — 120N000002 HC R&B MED SURG/OB UMMC

## 2023-12-12 PROCEDURE — C9290 INJ, BUPIVACAINE LIPOSOME: HCPCS | Performed by: STUDENT IN AN ORGANIZED HEALTH CARE EDUCATION/TRAINING PROGRAM

## 2023-12-12 PROCEDURE — 258N000003 HC RX IP 258 OP 636

## 2023-12-12 PROCEDURE — 370N000017 HC ANESTHESIA TECHNICAL FEE, PER MIN: Performed by: OBSTETRICS & GYNECOLOGY

## 2023-12-12 PROCEDURE — 272N000001 HC OR GENERAL SUPPLY STERILE: Performed by: OBSTETRICS & GYNECOLOGY

## 2023-12-12 PROCEDURE — 250N000013 HC RX MED GY IP 250 OP 250 PS 637

## 2023-12-12 PROCEDURE — 710N000010 HC RECOVERY PHASE 1, LEVEL 2, PER MIN: Performed by: OBSTETRICS & GYNECOLOGY

## 2023-12-12 PROCEDURE — 250N000013 HC RX MED GY IP 250 OP 250 PS 637: Performed by: OBSTETRICS & GYNECOLOGY

## 2023-12-12 PROCEDURE — 360N000076 HC SURGERY LEVEL 3, PER MIN: Performed by: OBSTETRICS & GYNECOLOGY

## 2023-12-12 PROCEDURE — 999N000141 HC STATISTIC PRE-PROCEDURE NURSING ASSESSMENT: Performed by: OBSTETRICS & GYNECOLOGY

## 2023-12-12 RX ORDER — HYDROCORTISONE 25 MG/G
CREAM TOPICAL 3 TIMES DAILY PRN
Status: DISCONTINUED | OUTPATIENT
Start: 2023-12-12 | End: 2023-12-14 | Stop reason: HOSPADM

## 2023-12-12 RX ORDER — ONDANSETRON 4 MG/1
4 TABLET, ORALLY DISINTEGRATING ORAL EVERY 30 MIN PRN
Status: DISCONTINUED | OUTPATIENT
Start: 2023-12-12 | End: 2023-12-14 | Stop reason: HOSPADM

## 2023-12-12 RX ORDER — KETOROLAC TROMETHAMINE 30 MG/ML
INJECTION, SOLUTION INTRAMUSCULAR; INTRAVENOUS PRN
Status: DISCONTINUED | OUTPATIENT
Start: 2023-12-12 | End: 2023-12-12

## 2023-12-12 RX ORDER — OXYTOCIN 10 [USP'U]/ML
10 INJECTION, SOLUTION INTRAMUSCULAR; INTRAVENOUS
Status: DISCONTINUED | OUTPATIENT
Start: 2023-12-12 | End: 2023-12-12 | Stop reason: HOSPADM

## 2023-12-12 RX ORDER — SIMETHICONE 80 MG
80 TABLET,CHEWABLE ORAL 4 TIMES DAILY PRN
Status: DISCONTINUED | OUTPATIENT
Start: 2023-12-12 | End: 2023-12-14 | Stop reason: HOSPADM

## 2023-12-12 RX ORDER — LABETALOL HYDROCHLORIDE 5 MG/ML
10 INJECTION, SOLUTION INTRAVENOUS
Status: DISCONTINUED | OUTPATIENT
Start: 2023-12-12 | End: 2023-12-14 | Stop reason: HOSPADM

## 2023-12-12 RX ORDER — METOCLOPRAMIDE 10 MG/1
10 TABLET ORAL EVERY 6 HOURS PRN
Status: DISCONTINUED | OUTPATIENT
Start: 2023-12-12 | End: 2023-12-14 | Stop reason: HOSPADM

## 2023-12-12 RX ORDER — OXYTOCIN/0.9 % SODIUM CHLORIDE 30/500 ML
340 PLASTIC BAG, INJECTION (ML) INTRAVENOUS CONTINUOUS PRN
Status: DISCONTINUED | OUTPATIENT
Start: 2023-12-12 | End: 2023-12-14 | Stop reason: HOSPADM

## 2023-12-12 RX ORDER — ONDANSETRON 2 MG/ML
4 INJECTION INTRAMUSCULAR; INTRAVENOUS EVERY 6 HOURS PRN
Status: DISCONTINUED | OUTPATIENT
Start: 2023-12-12 | End: 2023-12-14 | Stop reason: HOSPADM

## 2023-12-12 RX ORDER — MODIFIED LANOLIN
OINTMENT (GRAM) TOPICAL
Status: DISCONTINUED | OUTPATIENT
Start: 2023-12-12 | End: 2023-12-14 | Stop reason: HOSPADM

## 2023-12-12 RX ORDER — MORPHINE SULFATE 1 MG/ML
INJECTION, SOLUTION EPIDURAL; INTRATHECAL; INTRAVENOUS
Status: COMPLETED | OUTPATIENT
Start: 2023-12-12 | End: 2023-12-12

## 2023-12-12 RX ORDER — NALBUPHINE HYDROCHLORIDE 20 MG/ML
2.5-5 INJECTION, SOLUTION INTRAMUSCULAR; INTRAVENOUS; SUBCUTANEOUS EVERY 6 HOURS PRN
Status: DISCONTINUED | OUTPATIENT
Start: 2023-12-12 | End: 2023-12-14 | Stop reason: HOSPADM

## 2023-12-12 RX ORDER — ERYTHROMYCIN 5 MG/G
OINTMENT OPHTHALMIC
Status: DISCONTINUED
Start: 2023-12-12 | End: 2023-12-12 | Stop reason: HOSPADM

## 2023-12-12 RX ORDER — SODIUM CHLORIDE, SODIUM LACTATE, POTASSIUM CHLORIDE, CALCIUM CHLORIDE 600; 310; 30; 20 MG/100ML; MG/100ML; MG/100ML; MG/100ML
INJECTION, SOLUTION INTRAVENOUS CONTINUOUS
Status: DISCONTINUED | OUTPATIENT
Start: 2023-12-12 | End: 2023-12-12 | Stop reason: HOSPADM

## 2023-12-12 RX ORDER — KETOROLAC TROMETHAMINE 30 MG/ML
30 INJECTION, SOLUTION INTRAMUSCULAR; INTRAVENOUS EVERY 6 HOURS
Qty: 3 ML | Refills: 0 | Status: COMPLETED | OUTPATIENT
Start: 2023-12-12 | End: 2023-12-13

## 2023-12-12 RX ORDER — CEFAZOLIN SODIUM/WATER 2 G/20 ML
2 SYRINGE (ML) INTRAVENOUS SEE ADMIN INSTRUCTIONS
Status: DISCONTINUED | OUTPATIENT
Start: 2023-12-12 | End: 2023-12-12 | Stop reason: HOSPADM

## 2023-12-12 RX ORDER — OXYTOCIN/0.9 % SODIUM CHLORIDE 30/500 ML
PLASTIC BAG, INJECTION (ML) INTRAVENOUS CONTINUOUS PRN
Status: DISCONTINUED | OUTPATIENT
Start: 2023-12-12 | End: 2023-12-12

## 2023-12-12 RX ORDER — AMOXICILLIN 250 MG
1 CAPSULE ORAL 2 TIMES DAILY
Status: DISCONTINUED | OUTPATIENT
Start: 2023-12-12 | End: 2023-12-14 | Stop reason: HOSPADM

## 2023-12-12 RX ORDER — BUPIVACAINE HYDROCHLORIDE 2.5 MG/ML
INJECTION, SOLUTION EPIDURAL; INFILTRATION; INTRACAUDAL
Status: DISCONTINUED | OUTPATIENT
Start: 2023-12-12 | End: 2023-12-12

## 2023-12-12 RX ORDER — LIDOCAINE 40 MG/G
CREAM TOPICAL
Status: DISCONTINUED | OUTPATIENT
Start: 2023-12-12 | End: 2023-12-12 | Stop reason: HOSPADM

## 2023-12-12 RX ORDER — CITRIC ACID/SODIUM CITRATE 334-500MG
30 SOLUTION, ORAL ORAL ONCE
Status: COMPLETED | OUTPATIENT
Start: 2023-12-12 | End: 2023-12-12

## 2023-12-12 RX ORDER — TRANEXAMIC ACID 10 MG/ML
1 INJECTION, SOLUTION INTRAVENOUS EVERY 30 MIN PRN
Status: DISCONTINUED | OUTPATIENT
Start: 2023-12-12 | End: 2023-12-14 | Stop reason: HOSPADM

## 2023-12-12 RX ORDER — METOCLOPRAMIDE HYDROCHLORIDE 5 MG/ML
10 INJECTION INTRAMUSCULAR; INTRAVENOUS EVERY 6 HOURS PRN
Status: DISCONTINUED | OUTPATIENT
Start: 2023-12-12 | End: 2023-12-14 | Stop reason: HOSPADM

## 2023-12-12 RX ORDER — OXYTOCIN 10 [USP'U]/ML
10 INJECTION, SOLUTION INTRAMUSCULAR; INTRAVENOUS
Status: DISCONTINUED | OUTPATIENT
Start: 2023-12-12 | End: 2023-12-14 | Stop reason: HOSPADM

## 2023-12-12 RX ORDER — PROCHLORPERAZINE 25 MG
25 SUPPOSITORY, RECTAL RECTAL EVERY 12 HOURS PRN
Status: DISCONTINUED | OUTPATIENT
Start: 2023-12-12 | End: 2023-12-14 | Stop reason: HOSPADM

## 2023-12-12 RX ORDER — ACETAMINOPHEN 325 MG/1
975 TABLET ORAL EVERY 6 HOURS
Status: DISCONTINUED | OUTPATIENT
Start: 2023-12-12 | End: 2023-12-14 | Stop reason: HOSPADM

## 2023-12-12 RX ORDER — MISOPROSTOL 200 UG/1
400 TABLET ORAL
Status: DISCONTINUED | OUTPATIENT
Start: 2023-12-12 | End: 2023-12-12 | Stop reason: HOSPADM

## 2023-12-12 RX ORDER — BUPIVACAINE HYDROCHLORIDE 7.5 MG/ML
INJECTION, SOLUTION INTRASPINAL
Status: COMPLETED | OUTPATIENT
Start: 2023-12-12 | End: 2023-12-12

## 2023-12-12 RX ORDER — CARBOPROST TROMETHAMINE 250 UG/ML
250 INJECTION, SOLUTION INTRAMUSCULAR
Status: DISCONTINUED | OUTPATIENT
Start: 2023-12-12 | End: 2023-12-12 | Stop reason: HOSPADM

## 2023-12-12 RX ORDER — BISACODYL 10 MG
10 SUPPOSITORY, RECTAL RECTAL DAILY PRN
Status: DISCONTINUED | OUTPATIENT
Start: 2023-12-14 | End: 2023-12-14 | Stop reason: HOSPADM

## 2023-12-12 RX ORDER — IBUPROFEN 800 MG/1
800 TABLET, FILM COATED ORAL EVERY 6 HOURS
Status: DISCONTINUED | OUTPATIENT
Start: 2023-12-13 | End: 2023-12-14 | Stop reason: HOSPADM

## 2023-12-12 RX ORDER — NALOXONE HYDROCHLORIDE 0.4 MG/ML
0.2 INJECTION, SOLUTION INTRAMUSCULAR; INTRAVENOUS; SUBCUTANEOUS
Status: DISCONTINUED | OUTPATIENT
Start: 2023-12-12 | End: 2023-12-14 | Stop reason: HOSPADM

## 2023-12-12 RX ORDER — MISOPROSTOL 200 UG/1
800 TABLET ORAL
Status: DISCONTINUED | OUTPATIENT
Start: 2023-12-12 | End: 2023-12-12 | Stop reason: HOSPADM

## 2023-12-12 RX ORDER — OXYTOCIN/0.9 % SODIUM CHLORIDE 30/500 ML
340 PLASTIC BAG, INJECTION (ML) INTRAVENOUS CONTINUOUS PRN
Status: DISCONTINUED | OUTPATIENT
Start: 2023-12-12 | End: 2023-12-12 | Stop reason: HOSPADM

## 2023-12-12 RX ORDER — ONDANSETRON 4 MG/1
4 TABLET, ORALLY DISINTEGRATING ORAL EVERY 6 HOURS PRN
Status: DISCONTINUED | OUTPATIENT
Start: 2023-12-12 | End: 2023-12-14 | Stop reason: HOSPADM

## 2023-12-12 RX ORDER — FENTANYL CITRATE 50 UG/ML
15 INJECTION, SOLUTION INTRAMUSCULAR; INTRAVENOUS ONCE
Status: DISCONTINUED | OUTPATIENT
Start: 2023-12-12 | End: 2023-12-12 | Stop reason: HOSPADM

## 2023-12-12 RX ORDER — MORPHINE SULFATE 1 MG/ML
150 INJECTION, SOLUTION EPIDURAL; INTRATHECAL; INTRAVENOUS ONCE
Status: DISCONTINUED | OUTPATIENT
Start: 2023-12-12 | End: 2023-12-12 | Stop reason: HOSPADM

## 2023-12-12 RX ORDER — MISOPROSTOL 200 UG/1
800 TABLET ORAL
Status: DISCONTINUED | OUTPATIENT
Start: 2023-12-12 | End: 2023-12-14 | Stop reason: HOSPADM

## 2023-12-12 RX ORDER — TRANEXAMIC ACID 10 MG/ML
1 INJECTION, SOLUTION INTRAVENOUS EVERY 30 MIN PRN
Status: DISCONTINUED | OUTPATIENT
Start: 2023-12-12 | End: 2023-12-12 | Stop reason: HOSPADM

## 2023-12-12 RX ORDER — EPHEDRINE SULFATE 50 MG/ML
INJECTION, SOLUTION INTRAMUSCULAR; INTRAVENOUS; SUBCUTANEOUS PRN
Status: DISCONTINUED | OUTPATIENT
Start: 2023-12-12 | End: 2023-12-12

## 2023-12-12 RX ORDER — FENTANYL CITRATE-0.9 % NACL/PF 10 MCG/ML
100 PLASTIC BAG, INJECTION (ML) INTRAVENOUS EVERY 5 MIN PRN
Status: DISCONTINUED | OUTPATIENT
Start: 2023-12-12 | End: 2023-12-12 | Stop reason: HOSPADM

## 2023-12-12 RX ORDER — CARBOPROST TROMETHAMINE 250 UG/ML
250 INJECTION, SOLUTION INTRAMUSCULAR
Status: DISCONTINUED | OUTPATIENT
Start: 2023-12-12 | End: 2023-12-14 | Stop reason: HOSPADM

## 2023-12-12 RX ORDER — NALOXONE HYDROCHLORIDE 0.4 MG/ML
0.4 INJECTION, SOLUTION INTRAMUSCULAR; INTRAVENOUS; SUBCUTANEOUS
Status: DISCONTINUED | OUTPATIENT
Start: 2023-12-12 | End: 2023-12-14 | Stop reason: HOSPADM

## 2023-12-12 RX ORDER — AMOXICILLIN 250 MG
2 CAPSULE ORAL 2 TIMES DAILY
Status: DISCONTINUED | OUTPATIENT
Start: 2023-12-12 | End: 2023-12-14 | Stop reason: HOSPADM

## 2023-12-12 RX ORDER — LIDOCAINE 40 MG/G
CREAM TOPICAL
Status: DISCONTINUED | OUTPATIENT
Start: 2023-12-12 | End: 2023-12-14 | Stop reason: HOSPADM

## 2023-12-12 RX ORDER — PHYTONADIONE 1 MG/.5ML
INJECTION, EMULSION INTRAMUSCULAR; INTRAVENOUS; SUBCUTANEOUS
Status: DISCONTINUED
Start: 2023-12-12 | End: 2023-12-12 | Stop reason: HOSPADM

## 2023-12-12 RX ORDER — METHYLERGONOVINE MALEATE 0.2 MG/ML
200 INJECTION INTRAVENOUS
Status: DISCONTINUED | OUTPATIENT
Start: 2023-12-12 | End: 2023-12-14 | Stop reason: HOSPADM

## 2023-12-12 RX ORDER — CEFAZOLIN SODIUM/WATER 2 G/20 ML
2 SYRINGE (ML) INTRAVENOUS
Status: COMPLETED | OUTPATIENT
Start: 2023-12-12 | End: 2023-12-12

## 2023-12-12 RX ORDER — OXYTOCIN/0.9 % SODIUM CHLORIDE 30/500 ML
100-340 PLASTIC BAG, INJECTION (ML) INTRAVENOUS CONTINUOUS PRN
Status: DISCONTINUED | OUTPATIENT
Start: 2023-12-12 | End: 2023-12-14 | Stop reason: HOSPADM

## 2023-12-12 RX ORDER — MISOPROSTOL 200 UG/1
400 TABLET ORAL
Status: DISCONTINUED | OUTPATIENT
Start: 2023-12-12 | End: 2023-12-14 | Stop reason: HOSPADM

## 2023-12-12 RX ORDER — HYDRALAZINE HYDROCHLORIDE 20 MG/ML
2.5-5 INJECTION INTRAMUSCULAR; INTRAVENOUS EVERY 10 MIN PRN
Status: DISCONTINUED | OUTPATIENT
Start: 2023-12-12 | End: 2023-12-14 | Stop reason: HOSPADM

## 2023-12-12 RX ORDER — ONDANSETRON 2 MG/ML
INJECTION INTRAMUSCULAR; INTRAVENOUS PRN
Status: DISCONTINUED | OUTPATIENT
Start: 2023-12-12 | End: 2023-12-12

## 2023-12-12 RX ORDER — OXYCODONE HYDROCHLORIDE 5 MG/1
5 TABLET ORAL EVERY 4 HOURS PRN
Status: DISCONTINUED | OUTPATIENT
Start: 2023-12-12 | End: 2023-12-14 | Stop reason: HOSPADM

## 2023-12-12 RX ORDER — METHYLERGONOVINE MALEATE 0.2 MG/ML
200 INJECTION INTRAVENOUS
Status: DISCONTINUED | OUTPATIENT
Start: 2023-12-12 | End: 2023-12-12 | Stop reason: HOSPADM

## 2023-12-12 RX ORDER — SODIUM CHLORIDE, SODIUM LACTATE, POTASSIUM CHLORIDE, CALCIUM CHLORIDE 600; 310; 30; 20 MG/100ML; MG/100ML; MG/100ML; MG/100ML
INJECTION, SOLUTION INTRAVENOUS CONTINUOUS
Status: DISCONTINUED | OUTPATIENT
Start: 2023-12-12 | End: 2023-12-14 | Stop reason: HOSPADM

## 2023-12-12 RX ORDER — DEXAMETHASONE SODIUM PHOSPHATE 4 MG/ML
INJECTION, SOLUTION INTRA-ARTICULAR; INTRALESIONAL; INTRAMUSCULAR; INTRAVENOUS; SOFT TISSUE PRN
Status: DISCONTINUED | OUTPATIENT
Start: 2023-12-12 | End: 2023-12-12

## 2023-12-12 RX ORDER — FENTANYL CITRATE 50 UG/ML
INJECTION, SOLUTION INTRAMUSCULAR; INTRAVENOUS
Status: COMPLETED | OUTPATIENT
Start: 2023-12-12 | End: 2023-12-12

## 2023-12-12 RX ORDER — ACETAMINOPHEN 325 MG/1
975 TABLET ORAL ONCE
Status: COMPLETED | OUTPATIENT
Start: 2023-12-12 | End: 2023-12-12

## 2023-12-12 RX ORDER — PROCHLORPERAZINE MALEATE 10 MG
10 TABLET ORAL EVERY 6 HOURS PRN
Status: DISCONTINUED | OUTPATIENT
Start: 2023-12-12 | End: 2023-12-14 | Stop reason: HOSPADM

## 2023-12-12 RX ORDER — BUPIVACAINE HYDROCHLORIDE 7.5 MG/ML
1.6 INJECTION, SOLUTION EPIDURAL; RETROBULBAR ONCE
Status: DISCONTINUED | OUTPATIENT
Start: 2023-12-12 | End: 2023-12-12 | Stop reason: HOSPADM

## 2023-12-12 RX ORDER — FERROUS SULFATE 325(65) MG
325 TABLET ORAL
COMMUNITY

## 2023-12-12 RX ORDER — ONDANSETRON 2 MG/ML
4 INJECTION INTRAMUSCULAR; INTRAVENOUS EVERY 30 MIN PRN
Status: DISCONTINUED | OUTPATIENT
Start: 2023-12-12 | End: 2023-12-14 | Stop reason: HOSPADM

## 2023-12-12 RX ORDER — CITRIC ACID/SODIUM CITRATE 334-500MG
30 SOLUTION, ORAL ORAL
Status: DISCONTINUED | OUTPATIENT
Start: 2023-12-12 | End: 2023-12-12 | Stop reason: HOSPADM

## 2023-12-12 RX ORDER — DEXTROSE, SODIUM CHLORIDE, SODIUM LACTATE, POTASSIUM CHLORIDE, AND CALCIUM CHLORIDE 5; .6; .31; .03; .02 G/100ML; G/100ML; G/100ML; G/100ML; G/100ML
INJECTION, SOLUTION INTRAVENOUS CONTINUOUS
Status: DISCONTINUED | OUTPATIENT
Start: 2023-12-12 | End: 2023-12-14 | Stop reason: HOSPADM

## 2023-12-12 RX ADMIN — ONDANSETRON 4 MG: 2 INJECTION INTRAMUSCULAR; INTRAVENOUS at 10:31

## 2023-12-12 RX ADMIN — PHENYLEPHRINE HYDROCHLORIDE 100 MCG: 10 INJECTION INTRAVENOUS at 11:24

## 2023-12-12 RX ADMIN — MORPHINE SULFATE 0.15 MG: 1 INJECTION EPIDURAL; INTRATHECAL; INTRAVENOUS at 10:45

## 2023-12-12 RX ADMIN — Medication 2 G: at 10:51

## 2023-12-12 RX ADMIN — Medication 300 ML/HR: at 11:11

## 2023-12-12 RX ADMIN — SODIUM CITRATE AND CITRIC ACID MONOHYDRATE 30 ML: 500; 334 SOLUTION ORAL at 10:21

## 2023-12-12 RX ADMIN — FENTANYL CITRATE 15 MCG: 50 INJECTION INTRAMUSCULAR; INTRAVENOUS at 10:45

## 2023-12-12 RX ADMIN — PHENYLEPHRINE HYDROCHLORIDE 100 MCG: 10 INJECTION INTRAVENOUS at 11:29

## 2023-12-12 RX ADMIN — BUPIVACAINE 20 ML: 13.3 INJECTION, SUSPENSION, LIPOSOMAL INFILTRATION at 11:47

## 2023-12-12 RX ADMIN — PHENYLEPHRINE HYDROCHLORIDE 75 MCG/MIN: 10 INJECTION INTRAVENOUS at 10:45

## 2023-12-12 RX ADMIN — KETOROLAC TROMETHAMINE 30 MG: 30 INJECTION, SOLUTION INTRAMUSCULAR at 11:47

## 2023-12-12 RX ADMIN — EPHEDRINE SULFATE 10 MG: 5 INJECTION INTRAVENOUS at 11:31

## 2023-12-12 RX ADMIN — KETOROLAC TROMETHAMINE 30 MG: 30 INJECTION, SOLUTION INTRAMUSCULAR; INTRAVENOUS at 18:07

## 2023-12-12 RX ADMIN — BUPIVACAINE HYDROCHLORIDE 20 ML: 2.5 INJECTION, SOLUTION EPIDURAL; INFILTRATION; INTRACAUDAL at 11:47

## 2023-12-12 RX ADMIN — FAMOTIDINE 20 MG: 10 INJECTION, SOLUTION INTRAVENOUS at 09:05

## 2023-12-12 RX ADMIN — ACETAMINOPHEN 975 MG: 325 TABLET, FILM COATED ORAL at 17:15

## 2023-12-12 RX ADMIN — ONDANSETRON 4 MG: 2 INJECTION INTRAMUSCULAR; INTRAVENOUS at 15:15

## 2023-12-12 RX ADMIN — ACETAMINOPHEN 975 MG: 325 TABLET, FILM COATED ORAL at 09:04

## 2023-12-12 RX ADMIN — KETOROLAC TROMETHAMINE 30 MG: 30 INJECTION, SOLUTION INTRAMUSCULAR; INTRAVENOUS at 23:49

## 2023-12-12 RX ADMIN — DOCUSATE SODIUM 50 MG AND SENNOSIDES 8.6 MG 1 TABLET: 8.6; 5 TABLET, FILM COATED ORAL at 20:58

## 2023-12-12 RX ADMIN — ACETAMINOPHEN 975 MG: 325 TABLET, FILM COATED ORAL at 23:49

## 2023-12-12 RX ADMIN — SODIUM CHLORIDE, POTASSIUM CHLORIDE, SODIUM LACTATE AND CALCIUM CHLORIDE 500 ML: 600; 310; 30; 20 INJECTION, SOLUTION INTRAVENOUS at 09:00

## 2023-12-12 RX ADMIN — SODIUM CHLORIDE, POTASSIUM CHLORIDE, SODIUM LACTATE AND CALCIUM CHLORIDE: 600; 310; 30; 20 INJECTION, SOLUTION INTRAVENOUS at 09:00

## 2023-12-12 RX ADMIN — DEXAMETHASONE SODIUM PHOSPHATE 8 MG: 4 INJECTION, SOLUTION INTRA-ARTICULAR; INTRALESIONAL; INTRAMUSCULAR; INTRAVENOUS; SOFT TISSUE at 11:10

## 2023-12-12 RX ADMIN — BUPIVACAINE HYDROCHLORIDE IN DEXTROSE 1.6 ML: 7.5 INJECTION, SOLUTION SUBARACHNOID at 10:45

## 2023-12-12 ASSESSMENT — ACTIVITIES OF DAILY LIVING (ADL)
ADLS_ACUITY_SCORE: 24
ADLS_ACUITY_SCORE: 24
ADLS_ACUITY_SCORE: 20
ADLS_ACUITY_SCORE: 24
ADLS_ACUITY_SCORE: 24
ADLS_ACUITY_SCORE: 20
ADLS_ACUITY_SCORE: 20
ADLS_ACUITY_SCORE: 24

## 2023-12-12 ASSESSMENT — ENCOUNTER SYMPTOMS
DYSRHYTHMIAS: 0
SEIZURES: 0

## 2023-12-12 NOTE — H&P
History and Physical     Shantel Martinez MRN# [id number]   YOB: 1990 Age: 33 year old      Date of Admission: (Not on file)    Primary care provider: No Ref-Primary, Physician      Assessment and Plan:       33 year old  at 39w3d by 5w2d US, here for Scheduled RTLCS. Pregnancy is notable for hx of previous  2/2 presumed amniotic fluid embolism.    #hx of postpartum depression  - follow up pp for needs  - no PTA meds    #Hx of AFE   - Maternal Echo WNL with EF of 60-65%  - No concern for potential recurrence but would like to be prepared and knowledgeable for management    #Scheduled Repeat  Section  Indicated due to prior  under for presumed AFE. Will plan for a Pfannenstiel skin incision with  hysterotomy  - Labs: CBC, T&S, RPR   - Pre-op Hgb 10.9, Plts 211  - Placenta: Anterior  - Anesthesia: Spinal   - 2g Ancef   - PPH Meds/Ppx: all available  - Diet: NPO  - PPx: SCDs  - Consent: Discussed risks and benefits of procedure, including but not limited to bleeding, infection, injury to surrounding organs, injury to infant, and the potential need for another surgery should some injury go unrecognized or patient were to have continued bleeding. Patient had time to ask questions and expressed understanding of procedure and associated risks. Agreed to blood transfusion if necessary . Consent signed .      # PNC  - Rh positive, Rubella immune, GCT failed, normal GTT, GBS negative  - Other prenatal labs wnl  - S/P flu, Tdap vaccines     # FWB:   Cat 1 tracing, reactive; EFW 74%  - Continuous Fetal Monitoring  - Intrauterine resuscitative measures prn      Patient discussed with Dr. Jeter                  HPI:     33 year old  at 39w3d by 5w2d US, here for Scheduled RTLCS. Pregnancy is notable for hx of previous  2/2 presumed amniotic fluid embolism and anemia.    Pregnancy notable for:   - HX of AFE  - hx of prior emergent  under GETA    Her previous  " delivery was notable for AFE. Denies history of postpartum hemorrhage, shoulder dystocia, pre-eclampsia. No history of asthma or high blood pressure.    She reports good fetal movement. Denies LOF, vaginal bleeding, or contractions .  She denies fever, chills, SOB, chest pain, palpitations, N/V, LE swelling/tenderness.  No concerns for headache, vision changes, RUQ or epigastric pain      Patient has been NPO since midnight    OB History:    OB History    Para Term  AB Living   3 2 2 0 0 2   SAB IAB Ectopic Multiple Live Births   0 0 0 0 2      # Outcome Date GA Lbr Anand/2nd Weight Sex Delivery Anes PTL Lv   3 Current            2 Term 21 38w0d  3.544 kg (7 lb 13 oz) M CS-Unspec   GATO      Name: DECLAN SARMIENTO      Apgar1: 9  Apgar5: 9   1 Term 20 39w1d / 00:27 3.232 kg (7 lb 2 oz) M Vag-Spont   GATO      Name: DECLAN SARMIENTO      Apgar1: 9  Apgar5: 9        Prenatal Lab Results:  Lab Results   Component Value Date    AS Negative 2023    HEPBANG Nonreactive 2023    CHPCRT Negative 2023    GCPCRT Negative 2023    HGB 10.9 (L) 2023       GBS Status:   No results found for: \"GBS\"             Past Medical History:     Past Medical History:   Diagnosis Date    Amniotic fluid embolus, third trimester 2021    Carrier of group B Streptococcus     GBS (group B Streptococcus carrier), +RV culture, currently pregnant 2021    History of abnormal Pap smear 2014    Formatting of this note is different from the original. Date Procedure Result Plan  2013 Pap ASCUS HPV+   2014 Pap LSIL Colpo  2014 Colpo ECC negative Pap 1 year    PCOS (polycystic ovarian syndrome)     Postpartum depression     Varicella              Past Surgical History:     Past Surgical History:   Procedure Laterality Date     SECTION      due toamniotic fluid embolism    COLPOSCOPY CERVIX, BIOPSY CERVIX, ENDOCERVICAL CURETTAGE, COMBINED      WISDOM TOOTH " EXTRACTION               Social History:     Social History     Tobacco Use    Smoking status: Never    Smokeless tobacco: Never   Substance Use Topics    Alcohol use: Not Currently     Comment: occasional             Family History:     Family History   Problem Relation Age of Onset    Prostate Cancer Father 55             Immunizations:     Immunization History   Administered Date(s) Administered    COVID-19 12+ (2023-24) (Pfizer) 11/02/2023    COVID-19 Bivalent 12+ (Pfizer) 11/30/2022    COVID-19 MONOVALENT 12+ (Pfizer) 12/31/2020, 01/21/2021, 10/01/2021    Flu, Unspecified 10/30/2020    HPV Quadrivalent 02/02/2007, 04/02/2007, 08/03/2007    Hepatitis B Immunity: Titer 05/28/2021    Hepatitis B, Peds 07/20/1996, 08/20/1996, 02/13/2009    Hib, Unspecified 1990, 1990, 1990    Influenza (IIV3) PF 11/07/2008, 11/24/2010, 01/03/2013    Influenza Vaccine >6 months,quad, PF 12/16/2013, 09/20/2019, 09/23/2021, 10/12/2022, 11/02/2023    MMR 05/21/1992, 05/21/1993, 04/02/1996    Mantoux Tuberculin Skin Test 06/15/2010, 06/03/2013, 07/29/2014, 08/05/2014, 06/08/2015, 06/17/2015    Meningococcal ACWY (Menactra ) 02/13/2006    Polio, Unspecified  1990, 1990, 1990, 05/21/1993, 04/02/1996    RSV Vaccine (Abrysvo) 11/09/2023    Rubella Immunity: Titer/md Dx 05/28/2021    TDAP (Adacel,Boostrix) 06/03/2013, 06/13/2013, 11/19/2019, 10/14/2021, 11/02/2023    Td (Adult), Adsorbed 1990, 1991, 1991, 08/28/1992, 04/02/1996, 04/28/2003    Varicella 04/02/1996            Allergies:     Allergies   Allergen Reactions    Bactrim [Sulfamethoxazole-Trimethoprim] Other (See Comments)     Throat swelling             Medications:     No medications prior to admission.             Review of Systems & Physical Exam:     The Review of Systems is negative other than noted in the HPI      LMP 03/04/2023   Gen: NAD  CV: normal HR  Lungs: non-labored breathing  Abd: Gravid, non-tender,  non-distended  Ext: no peripheral extremity edema;    Estimated Fetal Weight: 74%    FHT:  Monitoring External  FHT: Baseline 150 bpm; mod variability;  accels present; no decelerations  TOCO 2 contractions in 10 minutes         Data:       Hamzah Adams, MS3  [employer]       Resident/Fellow Attestation   Ruben JUÁREZ, was present with the medical student who participated in the service and in the documentation of the note.  I have verified the history and personally performed the physical exam and medical decision making.  I agree with the assessment and plan of care as documented in the note.  I have reviewed the note and made changes as necessary.    Ruben Noonan DO, MA  [employer] [facility]- PGY2  8:52 AM December 12, 2023         '

## 2023-12-12 NOTE — BRIEF OP NOTE
[facility]  Brief Operative Note    Surgery Date:  2023  Surgeon(s): Kina Jeter MD  Assistants:  Ruben Noonan DO, MA G2    Preoperative Diagnoses:  -  at 39w3d  -  History of  section x1  - Hx of AFE    Postoperative diagnoses:  -  at 39w3d, now delivered    Procedure performed:  Repeat low segment transverse  section via pfannenstiel skin incision with 1 layer uterine closure    Anesthesia:  Spinal   Est Blood Loss (mL): 254 mL  Fluid replacement: 1100 mL crystalloid  Urine Output: 200 mL  Specimens: None  Additional intraoperative medications: none  Complications: None apparent    Operative findings:   - Single, liveborn infant on 2023. Apgars of 9 and 9 at one and five minutes.  Birth weight: 3850 g.  Fetal presentation: BABITA. Amniotic fluid: clear.    - Placenta intact with 3 vessel cord.     - Normal appearing uterus, fallopian tubes, ovaries.   -  No intraabdominal adhesions. Mild abdominal wall adhesions    Disposition: Transferred in stable condition to the PACU    Ruben Noonan DO, MA  [employer] [facility]- PGY2  10:02 AM 2023

## 2023-12-12 NOTE — PROGRESS NOTES
Data: Shantel Martinez transferred to [facility] room 7121 via zoom cart at 1400. Baby transferred via parent's arms.  Action: Receiving unit notified of transfer: Yes. Patient and family notified of room change. Report given to COLLIN Do at 1405. Belongings sent to receiving unit. Accompanied by Registered Nurse. Oriented patient to surroundings. Call light within reach. ID bands double-checked with receiving RN.  Response: Patient tolerated transfer and is stable.

## 2023-12-12 NOTE — PROGRESS NOTES
Patient arrived on unit at 0820. Patient is a  here for a repeat . Consents have been signed and questions about procedure have been asked and answered. Patients  is at the bedside.  scheduled for 1030.

## 2023-12-12 NOTE — ANESTHESIA PROCEDURE NOTES
"Intrathecal injection Procedure Note    Pre-Procedure   Staff -        Anesthesiologist:  Rizwana Delgado MD       Resident/Fellow: Emma Nguyen MD       Performed By: resident       Location: OB       Procedure Start/Stop Times: 2023 10:33 AM and 2023 10:46 AM       Pre-Anesthestic Checklist: patient identified, IV checked, risks and benefits discussed, informed consent, monitors and equipment checked, pre-op evaluation, at physician/surgeon's request and post-op pain management  Timeout:       Correct Patient: Yes        Correct Procedure: Yes        Correct Site: Yes        Correct Position: Yes   Procedure Documentation  Procedure: intrathecal injection       Diagnosis:  section       Patient Position: sitting       Skin prep: Chloraprep       Insertion Site: L4-5. (midline approach).       Needle Gauge: 25.        Needle Length (Inches): 3.5        Spinal Needle Type: Pencan       Introducer used       Introducer: 20 G       # of attempts: 2 and  # of redirects:  1    Assessment/Narrative         Paresthesias: No.       Sensory Level: T4       CSF fluid: clear.       Opening pressure was cmH2O while  Sitting.      Medication(s) Administered   0.75% Hyperbaric Bupivacaine (Intrathecal) - Intrathecal   1.6 mL - 2023 10:45:00 AM  Fentanyl PF (Intrathecal) - Intrathecal   15 mcg - 2023 10:45:00 AM  Morphine PF 1 mg/mL (Intrathecal) - Intrathecal   0.15 mg - 2023 10:45:00 AM  Medication Administration Time: 2023 10:33 AM      FOR [facility] ([facility]) ONLY:   Pain Team Contact information: please page the Pain Team Via [employer]. Search \"Pain\". During daytime hours, please page the attending first. At night please page the resident first.      "

## 2023-12-12 NOTE — PROGRESS NOTES
Patient arrived to [facility] unit via zoom cart at 1400 ,with belongings, accompanied by spouse/ significant other, with infant in arms. Received report from  Catalina Pond RN and Daily Ching RN  and checked bands. Unit and room orientation completd. Call light given and within arms reach; no concerns present at this time. Continue with plan of care.

## 2023-12-12 NOTE — OP NOTE
[facility]   OPERATIVE NOTE:  SECTION     Surgery Date:  2023  Surgeon(s): Kina Jeter MD  Assistants:  Ruben Noonan DO, MA G2     Preoperative Diagnoses:  -   at 39w3d  -  History of  section x1  -  Hx of AFE     Postoperative diagnoses:  -  at 39w3d, now delivered     Procedure performed:  Repeat low segment transverse  section via pfannenstiel skin incision with 1 layer uterine closure     Anesthesia:  Spinal   Est Blood Loss (mL): 254 mL  Fluid replacement: 1100 mL crystalloid  Urine Output: 200 mL  Specimens: None  Additional intraoperative medications: none  Complications: None apparent     Operative findings:   - Single, liveborn infant on 2023. Apgars of 9 and 9 at one and five minutes.  Birth weight: 3850 g.  Fetal presentation: BABITA. Amniotic fluid: clear.    - Placenta intact with 3 vessel cord.     - Normal appearing uterus, fallopian tubes, ovaries.   -  No intraabdominal adhesions. Mild abdominal wall adhesions     Disposition: Transferred in stable condition to the PACU    Indication: Shantel Martinez is a 33 year old, , who was admitted at 39w3d  for RLTCS. The risks, benefits, and alternatives of  delivery were explained and the patient agreed to proceed.     Procedure details:  After obtaining informed consent, the patient was taken to the operating room. She was placed in the dorsal supine position with a leftward tilt and prepped and draped in the usual sterile fashion.     Following test of adequate spinal anesthesia, the abdomen was entered through a transverse skin incision through her previous scar. The skin incision was made sharply and carried through the subcutaneous tissue to the fascia.  Fascia was incised in the midline and extended laterally with the Laughlin scissors.  The superior margin of the fascial incision was grasped with Kocher clamps and dissected from the underlying muscle with  sharp and blunt dissection, which was then repeated at the lower margin of the fascial incision.  The muscle was  in the midline.      The peritoneum was entered bluntly and the opening extended by digital dissection with care to avoid the bladder. A bladder blade was placed. The vesicouterine peritoneum was entered sharply with Metzenbaum scissors and incision extended laterally. The bladder flap was created digitally and the bladder blade replaced. The lower segment of the uterus was opened sharply in a transverse fashion and extended with digital pressure. The infant's head was noted to be in the BABITA position. It was elevated to the level of the hysterotomy and was delivered atraumatically, shoulders delivered easily thereafter. The cord was doubly clamped after 60 seconds and cut and the infant was handed off to the waiting  staff. A segment of the cord was cut and set aside for cord gases if needed.     The placenta was expressed from the uterus.  The uterus was left in the abdomen and cleared of all clots and debris.  The uterus was massaged. Pitocin was given through the running IV.  The hysterotomy was repaired with 0-vicryl suture in a running locked fashion.     The bilateral pericolic gutters were suctioned.  The hysterotomy was again inspected and found to be hemostatic.  The abdominal wall was examined and also found to be hemostatic.  The fascia was closed with a running suture of 0-vicryl.  Subcutaneous tissue was irrigated. Areas that were not hemostatic were controlled with cautery. The skin was closed with 4-0 monocryl in a subcuticular fashion. The patient tolerated the procedure well and was taken to the recovery room in stable condition. All sponge, needle and instrument counts were correct x2.     Dr. Jeter was present for the entire procedure.     Ruben Noonan DO, MA  [facility]- PGY2  11:58 AM 2023

## 2023-12-12 NOTE — ANESTHESIA CARE TRANSFER NOTE
Patient: Shantel Martinez    Procedure: Procedure(s):   SECTION       Diagnosis: Previous  delivery, antepartum condition or complication [O34.219]  Diagnosis Additional Information: No value filed.    Anesthesia Type:   Spinal     Note:    Oropharynx: oropharynx clear of all foreign objects and spontaneously breathing  Level of Consciousness: awake  Oxygen Supplementation: room air    Independent Airway: airway patency satisfactory and stable  Dentition: dentition unchanged  Vital Signs Stable: post-procedure vital signs reviewed and stable  Report to RN Given: handoff report given  Patient transferred to: PACU    Handoff Report: Identifed the Patient, Identified the Reponsible Provider, Reviewed the pertinent medical history, Discussed the surgical course, Reviewed Intra-OP anesthesia mangement and issues during anesthesia, Set expectations for post-procedure period and Allowed opportunity for questions and acknowledgement of understanding      Vitals:  Vitals Value Taken Time   BP 97/61 23 1210   Temp     Pulse 82 23 1210   Resp 17 23 1210   SpO2 100 % 23 1210   Vitals shown include unfiled device data.    Electronically Signed By: Emma Nguyen MD  2023  12:11 PM

## 2023-12-12 NOTE — ANESTHESIA PROCEDURE NOTES
TAP Procedure Note    Pre-Procedure   Staff -        Anesthesiologist:  Rizwana Delgado MD       Resident/Fellow: Emma Nguyen MD       Performed By: anesthesiologist and resident       Location: OB       Procedure Start/Stop Times: 2023 11:47 AM and 2023 11:59 AM       Pre-Anesthestic Checklist: patient identified, IV checked, site marked, risks and benefits discussed, informed consent, monitors and equipment checked, pre-op evaluation, at physician/surgeon's request and post-op pain management  Timeout:       Correct Patient: Yes        Correct Procedure: Yes        Correct Site: Yes        Correct Position: Yes        Correct Laterality: Yes        Site Marked: Yes  Procedure Documentation  Procedure: TAP       Diagnosis: POST  SECTION PAIN       Laterality: bilateral       Patient Position: supine       Skin prep: Chloraprep       Insertion Site: T9-10.       Needle Gauge: 21.        Needle Length (millimeters): 110        Ultrasound guided       1. Ultrasound was used to identify targeted nerve, plexus, vascular marker, or fascial plane and place a needle adjacent to it in real-time.       2. Ultrasound was used to visualize the spread of anesthetic in close proximity to the above referenced structure.       3. A permanent image is entered into the patient's record.       4. The visualized anatomic structures appeared normal.       5. There were no apparent abnormal pathologic findings.    Assessment/Narrative         The placement was negative for: blood aspirated, painful injection and site bleeding       Paresthesias: No.       Bolus given via needle. no blood aspirated via catheter.        Secured via.        Insertion/Infusion Method: Single Shot       Complications: none       Injection made incrementally with aspirations every 5 mL.    Medication(s) Administered   Bupivacaine 0.25% PF (Infiltration) - Infiltration   20 mL - 2023 11:47:00 AM  Bupivacaine liposome  "(Exparel) 1.3% LA inj susp (Infiltration) - Infiltration   20 mL - 12/12/2023 11:47:00 AM  Medication Administration Time: 12/12/2023 11:47 AM      FOR [facility] ONLY:   Pain Team Contact information: please page the Pain Team Via [employer]. Search \"Pain\". During daytime hours, please page the attending first. At night please page the resident first.      "

## 2023-12-12 NOTE — PLAN OF CARE
Goal Outcome Evaluation:      Plan of Care Reviewed With: patient      Data: VSS and postpartum checks WNL. Patient eating and drinking normally. Patient performing self care and able to care for infant. Fundus at 1 cm below U and firm  without massage.  lochia scant and  no blood clots. Dressing clean, dry and intact.   Action: Patient taking Tylenol and Toradol for pain and pain tolerable. Encouraged patient to breast feed every 2 - 3 hours and to monitor for cues to feed infant. Patient education done( education record).   Response: Patient participating in infant's care. Positive attachment with infant observed. Support/ spouse present at bedside and attentive to infant and patient.   Plan: Continue with the plan of cares.

## 2023-12-12 NOTE — DISCHARGE SUMMARY
[facility]   Discharge Summary    Shantel Martinez MRN# [id number]   Age: 33 year old YOB: 1990     Date of Admission:  2023  Date of Discharge::  23   Admitting Physician:  Kina Jeter MD  Discharge Physician:  Liliane Rosario MD           Admission Diagnoses:   - Intrauterine pregnancy at 39w3d  - hx of previous  2/2 presumed AFE  - Hx of postpartum depression          Discharge Diagnosis:     Same, delivered           Procedures:     Procedure(s): Repeat low segment transverse  section via pfannenstiel skin incision with single layer uterine closure   Spinal anesthesia                Medications Prior to Admission:     Medications Prior to Admission   Medication Sig Dispense Refill Last Dose    ferrous sulfate (FEROSUL) 325 (65 Fe) MG tablet Take 325 mg by mouth daily (with breakfast)   Past Week    Prenatal Vit-DSS-Fe Cbn-FA (PRENATAL AD PO)    2023             Discharge Medications:        Review of your medicines        START taking        Dose / Directions   acetaminophen 325 MG tablet  Commonly known as: TYLENOL      Dose: 650 mg  Take 2 tablets (650 mg) by mouth every 6 hours as needed for mild pain Start after Delivery.  Quantity: 100 tablet  Refills: 0     ibuprofen 600 MG tablet  Commonly known as: ADVIL/MOTRIN      Dose: 600 mg  Take 1 tablet (600 mg) by mouth every 6 hours as needed for moderate pain Start after delivery  Quantity: 60 tablet  Refills: 0     senna-docusate 8.6-50 MG tablet  Commonly known as: SENOKOT-S/PERICOLACE      Dose: 1 tablet  Take 1 tablet by mouth daily Start after delivery.  Quantity: 100 tablet  Refills: 0            CONTINUE these medicines which have NOT CHANGED        Dose / Directions   ferrous sulfate 325 (65 Fe) MG tablet  Commonly known as: FEROSUL      Dose: 325 mg  Take 325 mg by mouth daily (with breakfast)  Refills: 0     PRENATAL AD PO      Refills: 0               Where to get  your medicines        These medications were sent to [place], [place] - [address]  [address], [facility] [address]      Phone: [phone]   acetaminophen 325 MG tablet  ibuprofen 600 MG tablet  senna-docusate 8.6-50 MG tablet               Consultations:   Anesthesia   Lactation          Brief Admission History:   Ms. Shantel Martinez is a 33 year old now  who initially presented at 39w3d for scheduled Repeat  .       Intraoperative course   The procedure was uncomplicated.   mL.  See operative report for details.     Operative findings:   - Single, liveborn infant on 2023. Apgars of 9 and 9 at one and five minutes.  Birth weight: 3850 g.  Fetal presentation: BABITA. Amniotic fluid: clear.    - Placenta intact with 3 vessel cord.     - Normal appearing uterus, fallopian tubes, ovaries.   -  No intraabdominal adhesions. Mild abdominal wall adhesions       Postpartum Course   The patient's hospital course was unremarkable.  She recovered as anticipated and experienced no post-operative complications.  On discharge, her pain was well controlled. Vaginal bleeding is similar to peak menstrual flow.  Voiding without difficulty.  Ambulating well, tolerating a normal diet, and has resumption of bowel function.  No fever or significant wound drainage.  Breastfeeding well.  Infant is stable. She was discharged on post-partum day #2.    Post-partum hemoglobin:   Hemoglobin   Date Value Ref Range Status   2023 9.7 (L) 11.7 - 15.7 g/dL Final     Contraception: to discuss at 6 week  Rh positive, no Rhogam indicated  Rubella immune, no MMR indicated          Discharge Instructions and Follow-Up:     Discharge diet: Regular   Discharge activity: No lifting greater than 20 lbs, pushing, pulling, or other strenuous activity for 6 weeks. Pelvic rest for 6 weeks including no sexual intercourse, tampons, or douching. No driving until you can slam on the breaks  without pain or while on narcotic pain medications.    Discharge follow-up: Follow up with primary OB for routine postpartum visit in 6 weeks   Wound care: Keep incision clean and dry           Discharge Disposition:     Discharged to home           Marcelina Ramirez MD MPH  OB/Gyn Resident PGY-3  12/14/2023  6:36 AM

## 2023-12-12 NOTE — ANESTHESIA POSTPROCEDURE EVALUATION
Patient: Shantel Martinez    Procedure: Procedure(s):   SECTION       Anesthesia Type:  Spinal    Note:  Disposition: Inpatient   Postop Pain Control: Uneventful            Sign Out: Well controlled pain   PONV: No   Neuro/Psych: Uneventful            Sign Out: Acceptable/Baseline neuro status   Airway/Respiratory: Uneventful            Sign Out: Acceptable/Baseline resp. status   CV/Hemodynamics: Uneventful            Sign Out: Acceptable CV status; No obvious hypovolemia; No obvious fluid overload   Other NRE: NONE   DID A NON-ROUTINE EVENT OCCUR? No           Last vitals:  Vitals Value Taken Time   /65 23 1332   Temp 36.3  C (97.3  F) 23 1332   Pulse 83 23 1332   Resp 15 23 1332   SpO2 100 % 23 1332   Vitals shown include unfiled device data.    Electronically Signed By: Rizwana Delgado MD  2023  2:31 PM

## 2023-12-12 NOTE — PLAN OF CARE
Transfer to OR & C/S Delivery Note  Patient to OR at 1024 via ambulating.   Delivered viable Male via  section by Dr. Jeter.  To warmer, stimulated and dried by Leonora MCGOWAN  APGAR at 1 minute:  9 and APGAR at 5 minutes:  9.      Brought to mother for skin to skin in OR    OR to PACU Transfer Note  Data: Pt to OB PACU via cart. PIV infusing without complications, olivera with clear urine to gravity, vss, pt does not complain of pain and/or nausea.   Interventions: IV to pump, monitors and alarms on, SCD on.  Response: stable .  Plan: Patient instructed to notify RN for pain or nausea, routine post op cares, initiate breastfeeding/pumping as soon as patient/infant able.

## 2023-12-13 LAB — HGB BLD-MCNC: 9.7 G/DL (ref 11.7–15.7)

## 2023-12-13 PROCEDURE — 36415 COLL VENOUS BLD VENIPUNCTURE: CPT

## 2023-12-13 PROCEDURE — 85018 HEMOGLOBIN: CPT

## 2023-12-13 PROCEDURE — 250N000013 HC RX MED GY IP 250 OP 250 PS 637

## 2023-12-13 PROCEDURE — 250N000011 HC RX IP 250 OP 636

## 2023-12-13 PROCEDURE — 120N000002 HC R&B MED SURG/OB UMMC

## 2023-12-13 RX ORDER — CALCIUM CARBONATE 500 MG/1
500-1000 TABLET, CHEWABLE ORAL 3 TIMES DAILY PRN
Status: DISCONTINUED | OUTPATIENT
Start: 2023-12-13 | End: 2023-12-14 | Stop reason: HOSPADM

## 2023-12-13 RX ADMIN — ACETAMINOPHEN 975 MG: 325 TABLET, FILM COATED ORAL at 06:03

## 2023-12-13 RX ADMIN — DOCUSATE SODIUM 50 MG AND SENNOSIDES 8.6 MG 1 TABLET: 8.6; 5 TABLET, FILM COATED ORAL at 08:19

## 2023-12-13 RX ADMIN — IBUPROFEN 800 MG: 800 TABLET ORAL at 18:23

## 2023-12-13 RX ADMIN — CALCIUM CARBONATE (ANTACID) CHEW TAB 500 MG 1000 MG: 500 CHEW TAB at 06:19

## 2023-12-13 RX ADMIN — DOCUSATE SODIUM AND SENNOSIDES 2 TABLET: 8.6; 5 TABLET, FILM COATED ORAL at 20:23

## 2023-12-13 RX ADMIN — ACETAMINOPHEN 975 MG: 325 TABLET, FILM COATED ORAL at 18:23

## 2023-12-13 RX ADMIN — IBUPROFEN 800 MG: 800 TABLET ORAL at 12:03

## 2023-12-13 RX ADMIN — ACETAMINOPHEN 975 MG: 325 TABLET, FILM COATED ORAL at 12:03

## 2023-12-13 RX ADMIN — KETOROLAC TROMETHAMINE 30 MG: 30 INJECTION, SOLUTION INTRAMUSCULAR; INTRAVENOUS at 06:03

## 2023-12-13 ASSESSMENT — ACTIVITIES OF DAILY LIVING (ADL)
ADLS_ACUITY_SCORE: 20
ADLS_ACUITY_SCORE: 24
ADLS_ACUITY_SCORE: 20

## 2023-12-13 NOTE — PROGRESS NOTES
"[facility]   Post-partum Progress Note    Name:  Shantel Martinez  MRN: [id number]    S:   Patient reports feeling well this AM.  Pain well controlled.  Tolerating regular diet without nausea or vomiting. Ambulating without dizziness.  Voiding spontaneously. Has passed flatus; has not had bowel movement. Lochia similar to menstrual flow. Denies fever/chills, headache, vision changes, chest pain, shortness of breath, RUQ pain, increased edema.  Breastfeeding.    O:   Patient Vitals for the past 24 hrs:   BP Temp Temp src Pulse Resp SpO2 Height Weight   12/13/23 0415 109/64 98.2  F (36.8  C) Oral 60 16 99 % -- --   12/12/23 2352 99/60 98  F (36.7  C) Oral 60 16 99 % -- --   12/12/23 2100 103/56 98  F (36.7  C) Oral 66 16 100 % -- --   12/12/23 1807 108/70 98.1  F (36.7  C) Oral 80 16 100 % -- --   12/12/23 1700 98/70 97.8  F (36.6  C) Oral 93 16 100 % -- --   12/12/23 1600 104/70 -- -- 88 16 100 % -- --   12/12/23 1500 114/70 -- -- 85 16 100 % -- --   12/12/23 1430 92/73 97.5  F (36.4  C) Oral 89 16 100 % -- --   12/12/23 1400 108/67 97.4  F (36.3  C) Oral 84 16 100 % -- --   12/12/23 1332 105/65 97.3  F (36.3  C) Oral 87 15 100 % -- --   12/12/23 1326 -- -- -- 89 14 100 % -- --   12/12/23 1325 109/53 -- -- 81 20 100 % -- --   12/12/23 1312 -- -- -- 83 19 99 % -- --   12/12/23 1310 100/62 -- -- 80 -- 99 % -- --   12/12/23 1309 -- -- -- 80 15 99 % -- --   12/12/23 1230 97/63 -- -- 95 (!) 0 99 % -- --   12/12/23 1215 97/61 -- -- 88 12 100 % -- --   12/12/23 1208 93/54 97.4  F (36.3  C) Oral 88 12 -- -- --   12/12/23 0844 94/53 98.2  F (36.8  C) Oral 83 12 -- 1.753 m (5' 9\") 78 kg (172 lb)     Gen:  Resting comfortably, NAD  CV:  RRR  Pulm:  Non-labored breathing. No cough or audible wheezing.   Abd:  Soft, appropriately tender to palpation, non-distended.  Fundus below umbilicus, firm, and non-tender.  Inc:  Bandage in place, c/d/i with minimal overlying shadowing, no surrounding erythema or edema  Ext: "  Non-tender, + LE edema b/l    Hgb:   Recent Labs   Lab 23  1219   HGB 10.9*     A/P:  33 year old  POD#1 s/p RLTCS. Pregnancy notable for Hx CS x1, history of AFE. Meeting postop goals. Continue with routine postpartum management.     #Routine Postpartum  Pain: Well-controlled with scheduled tylenol, toradol > ibuprofen, PRN oxy  Hgb: Hgb 10.9>  > AM Hgb pending VSS as noted above, asymptomatic. Will discharge with PO Fe if hgb < 10  GI:  Regular diet. BID Senna/Colace. PRN Miralax, Simethicone. Encourage hydration and ambulation  : Voiding spontaneously  PPx:  Encouraged ambulation, Lovenox not indicated  Rh: Positive  Rub:  Immune  Hep B: Surface antigen negative  Baby: In room, doing well  Feed: Breastfeeding  BC: Not discussed  Dispo: Plan for home on POD#2-3.    Patient discussed with Dr. Chang.    Angelika Bey MD  Ob/Gyn Resident, PGY-3  2023 6:58 AM

## 2023-12-13 NOTE — LACTATION NOTE
This note was copied from a baby's chart.  Consult for:  Lactation support-mother with history of mastitis with with previous children     Infant Name: Hamzah    Infant's Primary Care Clinic: [facility]    Delivery Information:  Gestational Age: 39w3d via   delivery on 2023 11:10 AM     Maternal Health History:    Information for the patient's mother:  Shantel Martinez [[id number]]     Patient Active Problem List   Diagnosis    PCOS (polycystic ovarian syndrome)    History of amniotic fluid embolism    Need for Tdap vaccination    S/P  section           Maternal Breast Exam/Breastfeeding History:  Shantel noted breast growth and sensitivity in early pregnancy.Her breasts are soft and symmetrical with bilateral intact, everted nipples. She has been able to hand express colostrum. Reports history of mastitis and oversupply with previous children.     Infant information: AGA at birth and has age appropriate output and weight loss.      Weight Change Since Birth: -6% at 1 day old     Oral exam of baby:  Not assessed baby has recently fed and is sleeping in basinet     Feeding History: Mother reports breastfeeding is going very well, feels it is better than with her older children. Denies pain with latch. Feels breasts are beginning to fill. History of mastitis and oversupply. Management and prevention reviewed extensively, handouts provided.     Education:   - Stages of milk production  - Benefits of hand expression of colostrum  - Signs breastfeeding is going well (comfortable latch, audible swallows, age appropriate output and weight loss)    - Tips to prevent engorgement  - Signs of engorgement  - Tips to manage engorgement  -Prevention and management of mastitis  -Therapeutic breast massage  -Lecithin  - Pumping recommendations (based on patient need)  - Inpatient breastfeeding support  - Outpatient lactation resources    Handouts: [facility] Lactation Resources, Lecithin, Therapeutic  Breast Massage and LER Plugged Ducts/Mastitis     Plan: Continue breastfeeding on cue with RN support as needed with a goal of 8-12 feedings per day.     Encourage frequent skin to skin and hand expression.     Encouraged follow up with outpatient lactation consultant  as needed after discharge. Mother is planning for appointment with Jazmin Willams at [facility] in 1-2 weeks.       Magnolia Powell RN, IBCLC   Lactation Consultant  Ascom: *[phone]  Office: [phone]

## 2023-12-13 NOTE — PLAN OF CARE
Data: Vital signs within normal limits. Postpartum checks within normal limits - see flow record. Patient eating and drinking normally. Patient able to empty bladder independently and is up ambulating. No apparent signs of infection. Incision healing well. Patient performing self cares and is able to care for infant.  Action: Patient medicated during the shift for pain. See MAR. Patient reassessed within 1 hour after each medication and pain was improved - patient stated she was comfortable. Patient education done about self cares and pain management. See flow record.  Response: Positive attachment behaviors observed with infant. Support persons  present.   Plan: Anticipate discharge on 12/14.

## 2023-12-13 NOTE — PLAN OF CARE
Goal Outcome Evaluation:    VSS, BP on the soft side but this is her baseline. Denies chest pain, SOB, nausea, dizziness. Up ad melissa and voiding independently. PP assessments WDL. Incision covered, dressing CDI. Rates pain as being pretty manageable, taking tylenol and toradol, knows that oxycodone is available for breakthrough pain. Pt is breastfeeding baby independently, baby has been clusterfeeding so she is hoping to rest during the day. Great bonding observed with baby joan Goodson.  is present and supportive. Expresses that this delivery has been much better than her last and she is very grateful. Anticipate discharge to home 1-2 days.      Continue to assess and assist as needed per POC.

## 2023-12-13 NOTE — PLAN OF CARE
Goal Outcome Evaluation:      Plan of Care Reviewed With: patient      Data: VSS and postpartum checks WNL. Patient eating and drinking normally. Patient able to empty bladder independently and up ambulating. Patient performing self care and able to care for infant. Fundus at 1 cm below U and firm  without massage.  lochia scant and  no blood clots. Incision open to air with steri straps intact. No sign of infection.   Action: Patient taking Tylenol and Ibuprofen for pain and pain tolerable. Encouraged patient to breast  feed every 2 - 3 hours and to monitor for cues to feed infant. Patient education done( education record).   Response: Patient participating in infant's care. Positive attachment with infant observed.  Support/ spouse present at bedside and attentive to infant and patient.   Plan: Continue with the plan of cares.

## 2023-12-13 NOTE — PROVIDER NOTIFICATION
12/13/23 0614   Provider Notification   Provider Name/Title G2 Vinny   Method of Notification Electronic Page   Request Evaluate-Remote   Notification Reason Medication Request     Pt is requesting tums for heartburn, please order. Thanks!

## 2023-12-13 NOTE — PROGRESS NOTES
"Post  Anesthesia Follow Up Note    Patient: Shantel Martinez    Patient location: Postpartum floor.    Chief complaint: Acute postoperative pain management s/p intrathecal analgesic administration     Procedure(s) Performed:  Procedure(s):   SECTION    Anesthesia type: Spinal Block    Subjective:     Pain Control: Adequate    Additional ROS:  She does not complain of pruritis at this time. She denies weakness, denies paresthesia, denies difficulties breathing or voiding, denies nausea or vomiting. She is able to ambulate and tolerates regular diet.    Objective:    Respiratory Function (RR / SpO2 / Airway Patency): Satisfactory    Cardiac Function (HR / Rhythm / BP): Satisfactory    Strength and sensation lower extremities: Normal    Site of spinal/epidural insertion: No signs of infection or inflammation.     Last Vitals: /64 (BP Location: Right arm, Patient Position: Semi-Tierney's, Cuff Size: Adult Regular)   Pulse 60   Temp 36.8  C (98.2  F) (Oral)   Resp 16   Ht 1.753 m (5' 9\")   Wt 78 kg (172 lb)   LMP 2023   SpO2 99%   Breastfeeding Unknown   BMI 25.40 kg/m      Assessment and plan:   Shantel Martinez is a 33 year old female  POD #1 s/p   with IT bupivacaine, fentanyl and morphine; and single shot TAP nerve block injections.  Pt is ambulating without difficulty, no weakness or paresthesias.  There is no evidence of adverse side effects associated with spinal and nerve block injections.  The patient is receiving adequate incisional pain control at this time and anticipate up to 72 hours of incisional pain control.  However, we further anticipate that the patient will require opioid/nonopioid analgesics for visceral and muscle pain that is not controlled with local anesthetic.      In brief summary, her post-operative analgesia is adequate today. Further interventional analgesic strategies would be of little utility at this time. Thus, we recommend proceeding " with PO analgesics.    Thank you for including us in the care for this patient.    Emma Nguyen MD  Anesthesiology Resident, CA-1

## 2023-12-14 VITALS
BODY MASS INDEX: 24.69 KG/M2 | HEIGHT: 69 IN | HEART RATE: 80 BPM | OXYGEN SATURATION: 99 % | SYSTOLIC BLOOD PRESSURE: 121 MMHG | TEMPERATURE: 97.9 F | DIASTOLIC BLOOD PRESSURE: 69 MMHG | WEIGHT: 166.7 LBS | RESPIRATION RATE: 16 BRPM

## 2023-12-14 PROCEDURE — 250N000013 HC RX MED GY IP 250 OP 250 PS 637

## 2023-12-14 RX ORDER — AMOXICILLIN 250 MG
1 CAPSULE ORAL DAILY
Qty: 100 TABLET | Refills: 0 | Status: SHIPPED | OUTPATIENT
Start: 2023-12-14 | End: 2023-12-28

## 2023-12-14 RX ORDER — IBUPROFEN 600 MG/1
600 TABLET, FILM COATED ORAL EVERY 6 HOURS PRN
Qty: 60 TABLET | Refills: 0 | Status: SHIPPED | OUTPATIENT
Start: 2023-12-14

## 2023-12-14 RX ORDER — OXYCODONE HYDROCHLORIDE 5 MG/1
5 TABLET ORAL EVERY 6 HOURS PRN
Qty: 8 TABLET | Refills: 0 | Status: SHIPPED | OUTPATIENT
Start: 2023-12-14 | End: 2023-12-17

## 2023-12-14 RX ORDER — ACETAMINOPHEN 325 MG/1
650 TABLET ORAL EVERY 6 HOURS PRN
Qty: 100 TABLET | Refills: 0 | Status: SHIPPED | OUTPATIENT
Start: 2023-12-14 | End: 2023-12-28

## 2023-12-14 RX ADMIN — IBUPROFEN 800 MG: 800 TABLET ORAL at 00:13

## 2023-12-14 RX ADMIN — ACETAMINOPHEN 975 MG: 325 TABLET, FILM COATED ORAL at 00:13

## 2023-12-14 RX ADMIN — IBUPROFEN 800 MG: 800 TABLET ORAL at 06:08

## 2023-12-14 RX ADMIN — DOCUSATE SODIUM AND SENNOSIDES 2 TABLET: 8.6; 5 TABLET, FILM COATED ORAL at 08:18

## 2023-12-14 RX ADMIN — OXYCODONE HYDROCHLORIDE 5 MG: 5 TABLET ORAL at 06:08

## 2023-12-14 RX ADMIN — ACETAMINOPHEN 975 MG: 325 TABLET, FILM COATED ORAL at 06:09

## 2023-12-14 RX ADMIN — OXYCODONE HYDROCHLORIDE 5 MG: 5 TABLET ORAL at 00:13

## 2023-12-14 ASSESSMENT — ACTIVITIES OF DAILY LIVING (ADL)
ADLS_ACUITY_SCORE: 20

## 2023-12-14 NOTE — PLAN OF CARE
Goal Outcome Evaluation:      Plan of Care Reviewed With: patient      Discharge instructions read and explained to patient, all questions answered. Medications double checked with another RN. Medication given to patient. Refused Breast pump. Discharge package given to parent.  Patient discharged to home with all belongings.

## 2023-12-14 NOTE — PLAN OF CARE
Goal Outcome Evaluation:       Data: VSS, postpartum assessments WNL. Pt is voiding without difficulty, up ad melissa, passing gas, eating and drinking normally. Incision is open to air. Lochia is scant, no s/s infection. Reporting that breast feeding is doing well. Taking tylenol, ibuprofen and oxycodone. Reports good pain management.Education provided on.Pt is agreeable with her plan of care. Positive attachment behaviors observed with infant. Support person present. Will continue with plan of care.

## 2023-12-14 NOTE — LACTATION NOTE
Brief Lactation Consult    Checked in with mother in anticipation of discharge today. Baby is breastfeeding at time of consult, latch appears deep and mother reports it is comfortable. A good suck-swallow-breath pattern is noted with frequent audible swallows.   Mother has no questions or concerns at this time, plans to follow up outpatient d/t history of mastitis (see note from 12/13), has [facility].       Magnolia Powell RN, IBCLC   Lactation Consultant  Ascom: *[phone]  Office: [phone]

## 2023-12-14 NOTE — PROGRESS NOTES
[facility]   Post-partum Progress Note    Name:  Shantel Martinez  MRN: [id number]    S:   Patient reports feeling well this AM.  Pain well controlled.  Tolerating regular diet without nausea or vomiting. Ambulating without dizziness.  Voiding spontaneously. Has passed flatus; has not had bowel movement. Lochia similar to menstrual flow. Denies fever/chills, headache, vision changes, chest pain, shortness of breath, RUQ pain, increased edema.  Breastfeeding.    O:   Patient Vitals for the past 24 hrs:   BP Temp Temp src Pulse Resp SpO2   23 0010 107/60 97.9  F (36.6  C) Oral 81 16 --   23 1559 111/68 97.6  F (36.4  C) Oral 62 16 --   23 0816 105/58 97.9  F (36.6  C) Oral 73 -- --   23 0415 109/64 98.2  F (36.8  C) Oral 60 16 99 %     Gen:  Resting comfortably, NAD  CV:  RRR  Pulm:  Non-labored breathing. No cough or audible wheezing.   Abd:  Soft, appropriately tender to palpation, non-distended.  Fundus below umbilicus, firm, and non-tender.  Inc:  Bandage in place, c/d/i with minimal overlying shadowing, no surrounding erythema or edema  Ext:  Non-tender, + LE edema b/l    Hgb:   Recent Labs   Lab 23  0812 23  1219   HGB 9.7* 10.9*     A/P:  33 year old  POD#2 s/p RLTCS. Pregnancy notable for Hx CS x1, history of AFE. Meeting postop goals. Continue with routine postpartum management.     #Routine Postpartum  Pain: Well-controlled with scheduled tylenol, toradol > ibuprofen, PRN oxy  Hgb: Hgb 10.9>  > AM Hgb pending VSS as noted above, asymptomatic. Will discharge with PO Fe if hgb < 10  GI:  Regular diet. BID Senna/Colace. PRN Miralax, Simethicone. Encourage hydration and ambulation  : Voiding spontaneously  PPx:  Encouraged ambulation, Lovenox not indicated  Rh: Positive  Rub:  Immune  Hep B: Surface antigen negative  Baby: In room, doing well  Feed: Breastfeeding  BC: Not discussed  Dispo: Plan for home on POD#2-3.    Patient discussed with   Abraham.    Angelika Bey MD  Ob/Gyn Resident, PGY-3  2023 12:36 AM    Physician Attestation   I saw this patient with the resident and agree with the resident/fellow's findings and plan of care as documented in the note.      Sag[place] findings: Shantel Martinez is a 33 year old  who is PPD#2 s/p repeat CS. She is doing well post-partum and meeting all post-partum milestones. Plan for discharge to home later today.     Liliane Rosario MD  Date of Service (when I saw the patient): 23

## 2023-12-14 NOTE — DISCHARGE INSTRUCTIONS
Warning Signs after Having a Baby    Keep this paper on your fridge or somewhere else where you can see it.    Call your provider if you have any of these symptoms up to 12 weeks after having your baby.    Thoughts of hurting yourself or your baby  Pain in your chest or trouble breathing  Severe headache not helped by pain medicine  Eyesight concerns (blurry vision, seeing spots or flashes of light, other changes to eyesight)  Fainting, shaking or other signs of a seizure    Call 9-1-1 if you feel that it is an emergency.     The symptoms below can happen to anyone after giving birth. They can be very serious. Call your provider if you have any of these warning signs.    My provider s phone number: _______________________    Losing too much blood (hemorrhage)    Call your provider if you soak through a pad in less than an hour or pass blood clots bigger than a golf ball. These may be signs that you are bleeding too much.    Blood clots in the legs or lungs    After you give birth, your body naturally clots its blood to help prevent blood loss. Sometimes this increased clotting can happen in other areas of the body, like the legs or lungs. This can block your blood flow and be very dangerous.     Call your provider if you:  Have a red, swollen spot on the back of your leg that is warm or painful when you touch it.   Are coughing up blood.     Infection    Call your provider if you have any of these symptoms:  Fever of 100.4 F (38 C) or higher.  Pain or redness around your stitches if you had an incision.   Any yellow, white, or green fluid coming from places where you had stitches or surgery.    Mood Problems (postpartum depression)    Many people feel sad or have mood changes after having a baby. But for some people, these mood swings are worse.     Call your provider right away if you feel so anxious or nervous that you can't care for yourself or your baby.    Preeclampsia (high blood pressure)    Even if you  didn't have high blood pressure when you were pregnant, you are at risk for the high blood pressure disease called preeclampsia. This risk can last up to 12 weeks after giving birth.     Call your provider if you have:   Pain on your right side under your rib cage  Sudden swelling in the hands and face    Remember: You know your body. If something doesn't feel right, get medical help.     For informational purposes only. Not to replace the advice of your health care provider. Copyright 2020 [facility]. All rights reserved. Clinically reviewed by [employer], RNC-OB, MSN. [employer] [id number] - Rev .    Postop  Birth Instructions    Activity     Do not lift more than 10 pounds for 6 weeks after surgery.  Ask family and friends for help when you need it.  No driving until you have stopped taking your pain medications (usually two weeks after surgery).  No heavy exercise or activity for 6 weeks.  Don't do anything that will put a strain on your surgery site.  Don't strain when using the toilet.  Your care team may prescribe a stool softener if you have problems with your bowel movements.     To care for your incision:     Keep the incision clean and dry.  Do not soak your incision in water. No swimming or hot tubs until it has fully healed. You may soak in the bathtub if the water level is below your incision.  Do not use peroxide, gel, cream, lotion, or ointment on your incision.  Adjust your clothes to avoid pressure on your surgery site (check the elastic in your underwear for example).     You may see a small amount of clear or pink drainage and this is normal.  Check with your health care provider:     If the drainage increases or has an odor.  If the incision reddens, you have swelling, or develop a rash.  If you have increased pain and the medicine we prescribed doesn't help.  If you have a fever above 100.4 F (38 C) with or without chills when placing thermometer under your tongue.    The area around your incision (surgery wound), will feel numb.  This is normal. The numbness should go away in less than a year.     Keep your hands clean:  Always wash your hands before touching your incision (surgery wound). This helps reduce your risk of infection. If your hands aren't dirty, you may use an alcohol hand-rub to clean your hands. Keep your nails clean and short.    Call your healthcare provider if you have any of these symptoms:     You soak a sanitary pad with blood within 1 hour, or you see blood clots larger than a golf ball.  Bleeding that lasts more than 6 weeks.  Vaginal discharge that smells bad.  Severe pain, cramping or tenderness in your lower belly area.  A need to urinate more frequently (use the toilet more often), more urgently (use the toilet very quickly), or it burns when you urinate.  Nausea and vomiting.  Redness, swelling or pain around a vein in your leg.  Problems breastfeeding or a red or painful area on your breast.  Chest pain and cough or are gasping for air.  Problems with coping with sadness, anxiety or depression. If you have concerns about hurting yourself or the baby, call your provider immediately.    You have questions or concerns after you return home.

## 2023-12-14 NOTE — PLAN OF CARE
Goal Outcome Evaluation:      Plan of Care Reviewed With: patient      Data: VSS and postpartum checks WNL. Patient eating and drinking normally. Patient able to empty bladder independently and up ambulating. Patient performing self care and able to care for infant. Fundus at  1 cm below U and firm  without massage.  lochia scant and  no blood clots. Incision with steri straps intact.   Action: Patient taking Tylenol and Ibuprofen for pain. Encouraged patient to breast feed every 2 - 3 hours and to monitor for cues to feed infant. Patient education done( education record).   Response: Patient participating in infant's care. Positive attachment with infant observed. Independent with breast feeding.   Plan: Continue with the plan of cares.

## 2023-12-27 NOTE — PROGRESS NOTES
"Assessment:   1.  Sixteen day old infant gaining weight rapidly on exclusive breastfeedin oz over birthweight today  2.  Latch slightly shallow and intermittent/restless suck, likely r/t very fast milk flow  3.  Mother with milk oversupply and overactive milk letdown  4.  Mother with history of repeated mastitis in previous breastfeeding experiences    Plan:   Use good positioning for deep latch, with baby held close to body and baby's head/shoulders/hips in good alignment.  When in a seated position, use a pillow to help bring baby close to breasts, and stepstool to elevate your knees above hips.    For the fast milk letdown that makes it difficult for Hamzah to stay latched to the breast, try using the laid-back nursing position.  You can also use one hand to gently block some milk ducts during letdown and help baby more easily cope with flow.  You can also try taking baby off of breast when the strong milk letdown starts, and allow milk to flow out into burp cloth, re-latching baby after flow has slowed.   Continue to nurse baby on cue, 8-12 times each day.  Feed on one side until baby finishes active swallowing. Once swallowing slows, you can use breast compression to encourage more swallowing, but once there is no more active swallowing, and baby is either sleeping, coming off the breast, or just \"nibbling,\" it is OK to use a finger to take baby off the breast and move to the other breast.  You do not need to be concerned if it is just a short time, because he is able to take a good amount of milk in a very short feeding.  Offer the second side as well, but if Hamzah appears content, just start with that side the next feeding.   Try to minimize removing any more milk from your breasts than you need to provide for bottles:  if you are bothered by significant leaking, use a hand to put pressure over your areola for a minute or two.  This should stop the flow without your having to \"collect\" it.  If you " "do pump, with an electric pump, a hand pump or the Haakaa passive type of pump, release only enough to provide the bottle for your baby (3-4 oz).  If you find you are very uncomfortable and full between feedings and cannot comfortably wait until the next nursing session, use a pump or your hands to remove just enough milk for you to be comfortable--an ounce or so.  The less milk you remove from your breasts, allowing them to be a little full, the more effectively you will signal your breasts to decrease production to more closely match Hamzah's needs.  If after the first month you are still having difficulty with oversupply, you could consider the \"blocked feeding\" technique.  Provided with written info on blocked feeding as well as recommendations for management of breast inflammation/mastitis.  Follow up with lactation as needed, and pediatric provider as planned.  Miraculins can be used for brief questions, but it's important to know that messages are not seen Friday through Sunday. If urgent help is needed, Monday through Friday you can call [phone] and one of our lactation consultants will get the message and respond; if you need a rapid response over a weekend or holiday, it is best to call your on-call maternity or pediatric provider.  Please feel free to schedule a return visit if the concern is more detailed;  telephone visits are also an option if you don't feel you need to be seen in person.     Subjective: Shantel is here today because she has a history of oversupply and mastitis x2 with each of her previous children, and would like to avoid these difficulties this time if possible.  Feels that generally Hamzah is doing well, with comfortable latch, and overall is managing overactive letdown although he does pull on and off frequently with letdown.  In past has tried expressing a bit before feeding or just taking baby off the breast when letdown is strong.  To prevent leakage, Shantel is a using " milk-catcher without suction for most feedings, yielding 2-4 oz/feeding, as well as using Haakaa with suction in the mornings to manage fullness, yielding over 4 oz. Trying to minimize pumping: pumps only if a feeding is skipped for  to offer bottle: when pumping can release 10 oz in about 5 minutes.  Is taking lecithin 1200 mg bid to prevent breast inflammation/plugged ducts.    Shantel is fully vaccinated for Covid-19.    Hospital Course:  Planned repeat , uncomplicated procedure. Seen by hospital IBCLC due to history of oversupply and mastitis;  breastfeeding well in hospital.    Mother's Relevant Med/Surg History: PCOS, PPD, history of amniotic fluid embolus in during second birth; had biopsy of left breast calcifications on  which was normal, although developed hematoma complications;  removal of mole left breast several weeks ago    Breastfeeding Goals: to breastfeed for one year    Previous Breastfeeding Experience: Had mastitis and oversupply with previous children:  with first child occurred at 4 months and 9 months;  stopped breastfeeding at 9 months;  with second child moved to exclusive pumping at 6 weeks because of discomforts and difficulties of oversupply and overactive letdown.  Had mastitis x2 with that child and stopped breastfeeding at 3 months.    Infant's name: Hamzah  Infant's bday: 23  Gestational age: 39w3d  Infant's birth weight: 8 # 7.8 oz   Discharge weight: 7 # 15.2 oz     Pediatric Provider: Dr. Kem Auguste  Recent weights:  23:  8 # 9 oz  23:  8 # 14.5 oz  Peq Lactation Visit Questionnaire    2023  9:39 AM CST - Filed by Patient   What is your main concern today? over supply and history of mastitis   Your baby's first name: sherrill   Your baby's last name: clapper   Type of Birth    Your doctor/midwife: dr frost   Baby's doctor or nurse practitioner: dr layton   Baby's birthday: 2022   Birth weight: 8lb 8oz   Baby's weight  "just before leaving the hospital: unsure he lost 6%of birth weight   Baby's most recent weight: 8lb 14oz   Date: 12/21   How often does your baby eat? every 2-3 hrs   How long does each feeding last?  30-45 mins   How much of the time does your baby take both breasts when nursing? 60%   Can you hear the baby swallowing during nursing? Yes   How many times does your baby feed in 24 hours? 8   How many times does your baby urinate (pee) in 24 hours? 8- 10   How many stools (poops) does your baby have in 24 hours? many   Describe the color and consistency of the poop: Yellow seedy   Do you give your baby extra milk in addition to or instead of breastfeeding? Breastmilk   How much extra do you usually give? 3oz   How do you give extra milk? Bottle   Are you pumping your breasts? Yes   How often? only if he take a bottle   How much is pumped? 10oz   When you were pregnant did your breasts grow larger? Yes   Did your areola (the dark area around your nipple) grow larger or darker? Yes   Did you notice your breasts fill when your baby was 3-5 days old? Yes   Have you had any breast surgeries? No   Please select any of the following medical conditions you have been previously diagnosed with or are currently being treated for: Polycystic Ovarian Syndrome   What else would you like the lactation consultant to know?         Objective/Physical exam:   Her nipples are everted, the areola is compressible, the breast is soft and full.     Sore nipples: no   EPDS: 4, with \"never\" marked for question on thoughts of self-harm.    Assessment of infant: 78.76% Weight for age percentile   Age today: 16 days  Today's weight: 9 # 11 oz  Amount of milk transferred from LEFT side: 3 oz  Amount of milk transferred from RIGHT side:  none--baby appears satiated    Baby has full flexion of arms and legs, normal tone, behavior is alert and active, respirations are normal, skin is normal, hydration is normal, jaw is normal size and alignment, " palate is normal, frenulum is normal, baby can lateralize tongue, has adequate tongue lift, and tongue can protrude past bottom gum line. Upper labial frenulum is normal.    Suck exam:  Baby did not suck well on examining finger    Baby thrush: none    Jaundice: none      Feeding assessment: Baby can hold suction with tongue while at the breast, but tends to slightly shallow latch, likely r/t fast milk flow.  Does feed restlessless and come on/off the breast periodically--milk spraying with force.    Alignment: The baby was flex relaxed. Baby's head was aligned with its trunk. Baby did face mother. Baby was in laid-back position today.   Areolar Grasp: Baby was able to open mouth widely. Baby's lips were not pursed. Baby's lips did flange outward. Tongue was visible over bottom gum. Baby had complete seal.     Areolar Compression: Baby made intermittent rhythmic motion. There were no clicking or smacking sounds. There was no severe nipple discomfort. Nipples appeared rounded after feeding.    Audible swallowing: Baby made quiet sounds of swallowing: There was an increase in frequency after milk ejection reflex. The milk ejection reflex is overactive and milk supply is abundant.     /64 (BP Location: Right arm, Patient Position: Sitting, Cuff Size: Adult Regular)   Pulse 78   LMP 2023   Breastfeeding Yes   OB History    Para Term  AB Living   3 3 3 0 0 3   SAB IAB Ectopic Multiple Live Births   0 0 0 0 3      # Outcome Date GA Lbr Anand/2nd Weight Sex Delivery Anes PTL Lv   3 Term 23 39w3d  3.85 kg (8 lb 7.8 oz) M CS-LTranv   GATO      Name: Hamzah Sarmiento      Apgar1: 9  Apgar5: 9   2 Term 21 38w0d  3.544 kg (7 lb 13 oz) M CS-Unspec   GATO      Name: DECLAN SARMIENTO      Apgar1: 9  Apgar5: 9   1 Term 20 39w1d / 00:27 3.232 kg (7 lb 2 oz) M Vag-Spont   GATO      Name: DECLAN SARMIENTO      Apgar1: 9  Apgar5: 9       Current Outpatient Medications:     ferrous sulfate  (FEROSUL) 325 (65 Fe) MG tablet, Take 325 mg by mouth daily (with breakfast), Disp: , Rfl:     ibuprofen (ADVIL/MOTRIN) 600 MG tablet, Take 1 tablet (600 mg) by mouth every 6 hours as needed for moderate pain Start after delivery, Disp: 60 tablet, Rfl: 0    lecithin (LECITHIN) 1200 MG CAPS capsule, Take 1,200 mg by mouth 2 times daily, Disp: , Rfl:     Prenatal Vit-DSS-Fe Cbn-FA (PRENATAL AD PO), , Disp: , Rfl:   Past Medical History:   Diagnosis Date    Amniotic fluid embolus, third trimester 2021    Carrier of group B Streptococcus     GBS (group B Streptococcus carrier), +RV culture, currently pregnant 2021    History of abnormal Pap smear 2014    Formatting of this note is different from the original. Date Procedure Result Plan  2013 Pap ASCUS HPV+   2014 Pap LSIL Colpo  2014 Colpo ECC negative Pap 1 year    PCOS (polycystic ovarian syndrome)     Postpartum depression     Varicella      Past Surgical History:   Procedure Laterality Date     SECTION      due toamniotic fluid embolism     SECTION N/A 2023    Procedure:  SECTION;  Surgeon: Kina Jeter MD;  Location:  L[facility]D    COLPOSCOPY CERVIX, BIOPSY CERVIX, ENDOCERVICAL CURETTAGE, COMBINED      WISDOM TOOTH EXTRACTION       Family History   Problem Relation Age of Onset    Prostate Cancer Father 55       Time spent:  Chart review/precharting: 15 min prior to day of service  Face-to-face visit:   51 min   Documentation:  20 min   Total time spent on day of service: 71 min    LEE Sheilds, CNM, IBCLC

## 2023-12-28 ENCOUNTER — OFFICE VISIT (OUTPATIENT)
Dept: MIDWIFE SERVICES | Facility: CLINIC | Age: 33
End: 2023-12-28
Payer: COMMERCIAL

## 2023-12-28 VITALS — HEART RATE: 78 BPM | DIASTOLIC BLOOD PRESSURE: 64 MMHG | SYSTOLIC BLOOD PRESSURE: 110 MMHG

## 2023-12-28 DIAGNOSIS — N64.3 HYPERLACTATION: Primary | ICD-10-CM

## 2023-12-28 PROCEDURE — 99417 PROLNG OP E/M EACH 15 MIN: CPT | Mod: 24 | Performed by: ADVANCED PRACTICE MIDWIFE

## 2023-12-28 PROCEDURE — 99215 OFFICE O/P EST HI 40 MIN: CPT | Performed by: ADVANCED PRACTICE MIDWIFE

## 2023-12-28 RX ORDER — DIAPER,BRIEF,ADULT, DISPOSABLE
1200 EACH MISCELLANEOUS 2 TIMES DAILY
COMMUNITY

## 2023-12-28 ASSESSMENT — EDINBURGH POSTNATAL DEPRESSION SCALE (EPDS)
I HAVE BEEN SO UNHAPPY THAT I HAVE HAD DIFFICULTY SLEEPING: NOT AT ALL
TOTAL SCORE: 4
THINGS HAVE BEEN GETTING ON TOP OF ME: NO, MOST OF THE TIME I HAVE COPED QUITE WELL
I HAVE BEEN ANXIOUS OR WORRIED FOR NO GOOD REASON: NO, NOT AT ALL
THE THOUGHT OF HARMING MYSELF HAS OCCURRED TO ME: NEVER
I HAVE FELT SCARED OR PANICKY FOR NO GOOD REASON: NO, NOT AT ALL
I HAVE BLAMED MYSELF UNNECESSARILY WHEN THINGS WENT WRONG: NOT VERY OFTEN
I HAVE LOOKED FORWARD WITH ENJOYMENT TO THINGS: AS MUCH AS I EVER DID
I HAVE FELT SAD OR MISERABLE: NOT VERY OFTEN
I HAVE BEEN ABLE TO LAUGH AND SEE THE FUNNY SIDE OF THINGS: AS MUCH AS I ALWAYS COULD
I HAVE BEEN SO UNHAPPY THAT I HAVE BEEN CRYING: ONLY OCCASIONALLY

## 2023-12-28 NOTE — PATIENT INSTRUCTIONS
"  Use good positioning for deep latch, with baby held close to body and baby's head/shoulders/hips in good alignment.  When in a seated position, use a pillow to help bring baby close to breasts, and stepstool to elevate your knees above hips.    For the fast milk letdown that makes it difficult for Hamzah to stay latched to the breast, try using the laid-back nursing position.  You can also use one hand to gently block some milk ducts during letdown and help baby more easily cope with flow.  You can also try taking baby off of breast when the strong milk letdown starts, and allow milk to flow out into burp cloth, re-latching baby after flow has slowed.   Continue to nurse baby on cue, 8-12 times each day.  Feed on one side until baby finishes active swallowing. Once swallowing slows, you can use breast compression to encourage more swallowing, but once there is no more active swallowing, and baby is either sleeping, coming off the breast, or just \"nibbling,\" it is OK to use a finger to take baby off the breast and move to the other breast.  You do not need to be concerned if it is just a short time, because he is able to take a good amount of milk in a very short feeding.  Offer the second side as well, but if Hamzah appears content, just start with that side the next feeding.   Try to minimize removing any more milk from your breasts than you need to provide for bottles:  if you are bothered by significant leaking, use a hand to put pressure over your areola for a minute or two.  This should stop the flow without your having to \"collect\" it.  If you do pump, with an electric pump, a hand pump or the Haakaa passive type of pump, release only enough to provide the bottle for your baby (3-4 oz).  If you find you are very uncomfortable and full between feedings and cannot comfortably wait until the next nursing session, use a pump or your hands to remove just enough milk for you to be comfortable--an ounce or so.  " "The less milk you remove from your breasts, allowing them to be a little full, the more effectively you will signal your breasts to decrease production to more closely match Hamzah's needs.  If after the first month you are still having difficulty with oversupply, you could consider the \"blocked feeding\" technique.  Provided with written info on blocked feeding.  Follow up with lactation as needed, and pediatric provider as planned.  [employer] can be used for brief questions, but it's important to know that messages are not seen Friday through Sunday. If urgent help is needed, Monday through Friday you can call [phone] and one of our lactation consultants will get the message and respond; if you need a rapid response over a weekend or holiday, it is best to call your on-call maternity or pediatric provider.  Please feel free to schedule a return visit if the concern is more detailed;  telephone visits are also an option if you don't feel you need to be seen in person.   ___________       If you find that you are having a lot of uncomfortable leaking, you can just use a hand to put some pressure on your areolar area and this will stop the flow.  If you would like to collect the milk that is released with letdown without encouraging more supply, consider a type of  that does not use suction to stay in place.  The Ha[employer], Norma Catch, and [employer] Milk-Saver can all be used in this way.  Alternatively, if you would like to use a  to gather enough milk for a bottle, you could use a type that stays in place with suction and draws out more milk.  The best way to use these is to nurse your baby on one side, and then when you move baby to the second side, place the  on the first side.  In this way baby always gets \"first choice.\"  Just collect the amount of milk that you will use.     _____________    Sometimes an area of inflammation develops in the breast, with a lump or fullness, " "pain and sometimes redness.  This is often called a \"plugged duct,\" even though it is not actually a physical clogging of one milk duct.  It is comes from the milk ducts being narrowed by inflammation.  The main things to do are directed at decreasing inflammation:  use ice on that area if it is comfortable for you, and take ibuprofen for pain and its anti-inflammatory effects.  If heat makes the area more comfortable, especially during feeding, you can use that;  if it is helpful you can also try some very gentle massage. The massage should be extremely light, like petting a cat, and go from the center of your breast outwards towards your armpit.  This helps the swelling and extra fluid move out of your breast.  Do not do firm massage--this is not helpful (because there is no actual \"plug\" to \"squeeze out\"), and it just further injures and inflames the breast tissue.  You can also add a lecithin supplement, 1200 mg four times daily, to help with milk flow and reduce inflammation.  Continue to nurse your baby as you have been--you do not need to nurse more on that side, because increasing the milk supply will actually just worsen the problem.  If you are pumping, just pump the amount that your little one needs.      Occasionally if the inflammation increases, women can feel tired, achy or have a fever--this is called inflammatory mastitis, and it generally goes away within 24 hours as you do the things mentioned above.  If it doesn't get better, though, and you are feeling ill or have a fever for more than 24 hours, then you may have a bacterial infection that needs medication--so you should call your midwife or doctor about a possible prescription.    Milk blebs (small white or creamy-colored painful dots on the nipple) are also very common, and frequently associated with breast inflammation and pain.  These blebs, or milk blisters, come from inflammatory cells in the milk ducts that have migrated to the surface of " the nipple and lodged there, causing pain.  The same types of things will be helpful with these:  Use a lecithin supplement 1200mg three times daily, to help with milk flow and prevent recurrent blebs, and take ibuprofen 600-800mg three times daily for 1-2 weeks to reduce inflammation and help with pain.    Soaking in saline or Epsom salt water, although previously recommended and very popular, has not been found to be helpful, and can inflame the skin due to the continued moisture.  Using a needle to release the bleb is also not helpful, as they can become infected or recur.  If the above measures are unsuccessful for relieving the bleb after a week or two, we can use a prescription steroid ointment that decreases inflammation at the nipple itself.      _________    -------------------------------------------------------------------------------------------------------------------      Strategies to Slow Down the Rate of Milk Production, from [employer] website    Changing the way you feed your baby can reduce both the overall milk volume and the amount of lactose baby receives, while increasing the amount of fat. Since mothers with oversupply often produce enough milk in each breast for a full feeding, one strategy that can be very successful is to feed the baby on only one side per feeding. If your baby wants to nurse again within two hours, see how he responds if you continue to offer that same side. In the next two hours, offer only the other breast. The breasts should gradually slow down their rate of milk production because milk is being removed less often. This helps down-regulate the milk production rate to match baby's true needs while also reducing the amount of foremilk and lactose baby receives.[3] When you keep baby at the same breast for a longer period of time, it also ensures that your baby is fully draining the breast and getting more of the higher calorie hindmilk.    If you are uncomfortable  "on the breast that is not being used before you are ready to nurse on it again, you can hand express or pump for only a few moments (20-30 seconds or less), just enough to relieve some discomfort. Do not pump too much or you will signal your breasts to produce even more milk. There is a certain whey protein in the milk, called \"Feedback Inhibitor of Lactation\" (DONTAE), that begins to build up and concentrate when milk is not removed for a while. This protein needs to be allowed to build up high enough to trigger the breast to cut back milk production. By removing just barely enough milk to be comfortable, but still allowing the breast to be full enough to trigger the \"cut back milk production\" message, most mothers can decrease milk production without risking plugged ducts or a breast infection. Many mothers find that cold compresses -- chilled raw green cabbage leaves or a bag of frozen peas -- help ease the discomfort and reduce swelling from being overly full.    You will know the strategy of feeding only on one side for extended periods is working when your baby becomes less fussy and seems more satisfied between feedings, and his stool becomes less watery and more yellow. He will also gulp, choke, and sputter less during feedings, because the milk is not flowing as fast.    If you find that your baby is still having difficulty after four to seven days of feeding only on one breast per feeding, you may need to breastfeed on just one breast for a longer period of time (two or three feedings or even longer) in order to decrease your milk production further. Some mothers with extreme oversupply may need to feed only on one breast for as long as 12 hours. It is best to extend the time on one breast very slowly and carefully, going longer only if milk production is not slowing down. Feeding on one side for too long could lead to decreasing milk production too much. (Most mothers with oversupply find that it is easy to " "regain adequate milk production with a few additional pumping sessions.)    If you have very enthusiastic breasts and the strategy of feeding on only one side for extended periods is not taming them yet, you may need to try a more extreme structured approach that initially does include some pumping. Start by pumping both sides thoroughly so that your breasts are fairly soft about an hour before a feeding. Then feed on one breast for several feedings until that breast is completely soft and comfortable and the other breast starts to feel unbearably full. When you feel unbearably full, switch sides and feed on the second breast until the first breast starts to feel unbearably full. It may be necessary to pump both sides a second time during the day, both for comfort and to liao off plugged ducts. For the next several days, continue to feed on one breast until the other one feels overfull. This will result in keeping baby to one side for several hours before switching to the other side. As your body is allowed to get the \"overfull\" message, it will respond by slowing the rate of milk production, and pumping should gradually become unnecessary.    Strategies to Reduce Milk Ejection Force  When a mother produces a large volume of milk, her milk ejection reflex will be stronger. All that milk rushing down the ducts may be more than baby can handle. It's like trying to drink from a garden hose that is turned on full-blast, while lying down on your back. In addition to choking, sputtering, and coughing when the milk comes out, baby may try to control the milk flow by pulling away and adapting a shallow latch. Shallow latching can be very painful for a mother. Or he may simply clamp down in an attempt to slow the flow, resulting in a \"biting\" sensation. Baby may also scream and arch his back to let you know that something needs to be changed.    It is sometimes recommended that mothers who have oversupply or an overactive " "milk ejection (let-down) hand-express or pump an ounce or so of milk prior to feeding to help slow the milk flow. However, this practice tends to be counterproductive, because removing that much milk from the breasts increases milk production, which in turn will increase the force of flow even more.    There are many strategies that can help manage a forceful milk ejection without increasing milk production. Placing the baby in a more upright position, so that his head is higher than the breast, will allow him more control during the feeding. Or position yourself so that you are leaning backwards, with the baby almost on top of you after he latches. In this position the milk has to flow \"uphill,\" which will reduce the force of your milk ejection reflex.    Some mothers find that a \"scissors\" hold on the areola, with the nipple between the forefinger and middle finger, helps restrict the flow of milk. Another option is to apply pressure on the side of your breast with the heel of your hand. (Try to vary the position of your hand to avoid constricting the ducts and inadvertently causing a plug.) You may also find it helpful to breastfeed just as your baby is waking from naps. He will suck more gently when he is still sleepy.    If baby starts to choke/sputter, unlatch him and let the excess milk spray into a towel or cloth. Relatch him when the force of the milk ejection has lessened.  Many mothers with oversupply find that nursing in a side-lying position makes feedings go more smoothly because milk flows from the breast horizontally without the force of gravity and babies can let excess milk dribble downward from their mouths rather than having to swallow it all. (Place a towel under you and baby to absorb the extra milk.) Use a rolled-up receiving blanket against your baby's back to keep him from rolling away from you. If you are still lying down together at the next feeding and are ready to offer the other breast, " you can just roll with your baby onto your other side, so that the second breast is against the bed and not flowing downhill with increasing force.    You may find that the side-lying position is somewhat difficult to master and that it is not as easy to get a good latch when lying down. Sometimes it helps to place baby with nipple pointing at his nose so that his neck is extended and he is looking up toward the breast as he latches. Some mothers latch baby onto the breast while sitting up and then slowly slide down into a side-lying position while holding baby gently but firmly so he stays attached. One great benefit of learning to nurse lying down is that you can drift off to sleep while your baby nurses. Don't worry that your baby will have difficulty breathing; babies choose air over food. So long as there are no pillows or blankets around his head, he will be able to move his head freely when he is finished nursing. He may rest his head against your breast as if it were a pillow. (Note that mothers with very large, pillowy breasts need to take special care that baby has room to move his head.)   Some babies cope with their mothers' strong milk ejection by taking only a little milk at a time, stopping and starting frequently. It is almost as if they are enjoying several courses to their meal. This is absolutely fine; allow him to come on and off the breast as he needs to, making sure to keep offering your baby the same breast for each course so that he has the opportunity to get the cream.    Although it can be tempting to stretch out feedings when your nipples are sore, feeding baby before he gets extremely hungry will keep him from sucking too aggressively, which not only hurts when nipples are already sore, but can cause further nipple damage. An overly strong suck can also cause a stronger milk ejection, making the feeding more difficult.    Other Oversupply Problems  Oversupply can contribute to leaking.  When your breasts leak at inopportune moments, apply pressure to the nipples by pressing your arms or the heels of your hands tightly against your chest for a few minutes. Some mothers find that splashing cold water on nipples or rubbing them gently with ice every three to four hours is helpful in reducing leaking.[3] Other mothers have benefited from the use of commercial products that are designed to reduce leaking. Most mothers find that leaking subsides significantly after the first few months.    Although most  babies do not need much burping, mothers who make large volumes of milk find that their babies take in more air while feeding and are gassy from the excess lactose. Frequent burping will minimize problems from swallowed air. Remember to bring baby back to the first breast rather than switching sides after burping.    Summary of Strategies to Reduce Rate of Milk Production and Force of Milk Ejection  Nurse on one side for a each feeding, continuing to offer that same side for at least two hours until the next full feeding   Gradually increase the length of time feeding from one breast if necessary   If this strategy is not effective, try the method of throughly pumping breasts and then feeding on one breast until unbearably full (described in detail above)  If the other breast feels unbearably full before you are ready to nurse on it, pump or hand express for a few moments to relieve some of the pressure   Use cold raw green cabbage leaves or a bag of frozen peas to reduce discomfort and swelling   See the Engorgement FAQ for more suggestions  Feed baby before he becomes overly hungry to minimize aggressive sucking   Try alternate nursing positions   Mother leaning far back   Side-lying (letting milk dribble out)  Use scissors hold or the side of your hand to compress ducts to reduce the force of the milk ejection   If baby chokes or sputters, unlatch him and let the excess milk spray into a  "towel or cloth   Allow baby to come on and off the breast at will   Burp frequently if baby is gassy   Certain herbs and drugs, used judiciously, may be helpful in reducing milk production    ------------------------------------------------------------------------------------------------------    Description of Full Drainage Blocked Feedings  The treatment sequence starts with an as-complete-as-possible mechanical drainage of both breasts. It is impossible to really empty an active, lactating breast completely, because the production of milk is an ongoing process. Emptying the breast is a major trigger for renewed production activity. Manual expression is a possibility, too, but in most cases mechanical extraction will work more efficiently and rapidly, especially if a simultaneous double pump is used. The infant will latch on immediately after drainage and will be offered both \"empty\" breasts to satisfaction. Many infants will fall asleep fully satisfied with high fat hindmilk, many for the first time. Subsequently the rest of the day is divided into equal time blocks starting with about three hours, initially. Every time the infant shows hunger cues or other signs of interest in the breast the same breast will be offered without any restriction in either frequency or duration of feeds. At the end of such a time block, or after a multi-hour period of sleep, baby will be offered the other breast for all feeds within the next time block. It is important that the best possible positioning and efficient latching techniques be used starting right from the very first feeding after pumping, for the sake of both the baby's improved suckling habits and the mother's comfort and future production. Depending on the seriousness of the symptoms time blocks may gradually be increased to 4, 6, 8 or even 12 hours. For less complex situations one-time mechanical drainage will suffice; for others occasional repetition may be " necessary. Intervals between drainage will gradually increase as the symptoms lessen.  Mothers must be cautioned not to drain the breasts too often in order to avoid extra stimulation for milk production. Only if engorgement is becoming severe again should another drainage be carried out. In using FDBF the mother will need to be instructed, cautioned and monitored for temporarily recurring over-fullness and plugged ducts or mastitis. After the first full drainage, in some women the breasts will initially continue to produce more than asked for and thus refill. In many others just a single full drainage will suffice to decrease milk production to acceptable levels.      Full drainage blocked feeding example:     Baby sleeps from 1am to 5 am.   Before the 5 am feeding, pump fully on both sides, until no milk is seen in the pump for two minutes, but not longer than 30 min.  Then put the baby to both breasts, and baby will get hind milk. The breasts are always making milk, like the bladder is always making urine.  Then pick either side left or right and bring the baby to only one side for a block of three hours. If the baby wants to eat more than once during the three hours, bring the baby to breast as many times as they want during the three hour block.     9am-12pm right side  12pm-3pm left side  3pm-6pm right side  6pm-9pm left side  9pm-12am right side  12am-3am left side  3am-6am right side     If the baby sleeps eats on the right side last and then sleeps four hours for a daytime nap or at night, when it is time to feed next go the left breast even if if has been longer than 3 hours.     Sometimes one or two days makes a difference, sometimes a week, and only occasionally longer than seven days is needed.

## 2024-01-31 ENCOUNTER — PRENATAL OFFICE VISIT (OUTPATIENT)
Dept: OBGYN | Facility: CLINIC | Age: 34
End: 2024-01-31
Payer: COMMERCIAL

## 2024-01-31 VITALS
HEART RATE: 93 BPM | WEIGHT: 152.1 LBS | DIASTOLIC BLOOD PRESSURE: 64 MMHG | BODY MASS INDEX: 22.46 KG/M2 | OXYGEN SATURATION: 95 % | SYSTOLIC BLOOD PRESSURE: 101 MMHG

## 2024-01-31 PROBLEM — Z23 NEED FOR TDAP VACCINATION: Status: RESOLVED | Noted: 2023-04-24 | Resolved: 2024-01-31

## 2024-01-31 PROBLEM — Z98.891 S/P CESAREAN SECTION: Status: RESOLVED | Noted: 2023-12-12 | Resolved: 2024-01-31

## 2024-01-31 PROCEDURE — 99207 PR POST PARTUM EXAM: CPT | Performed by: OBSTETRICS & GYNECOLOGY

## 2024-01-31 ASSESSMENT — ANXIETY QUESTIONNAIRES
6. BECOMING EASILY ANNOYED OR IRRITABLE: NOT AT ALL
2. NOT BEING ABLE TO STOP OR CONTROL WORRYING: NOT AT ALL
7. FEELING AFRAID AS IF SOMETHING AWFUL MIGHT HAPPEN: NOT AT ALL
GAD7 TOTAL SCORE: 0
3. WORRYING TOO MUCH ABOUT DIFFERENT THINGS: NOT AT ALL
IF YOU CHECKED OFF ANY PROBLEMS ON THIS QUESTIONNAIRE, HOW DIFFICULT HAVE THESE PROBLEMS MADE IT FOR YOU TO DO YOUR WORK, TAKE CARE OF THINGS AT HOME, OR GET ALONG WITH OTHER PEOPLE: NOT DIFFICULT AT ALL
5. BEING SO RESTLESS THAT IT IS HARD TO SIT STILL: NOT AT ALL
1. FEELING NERVOUS, ANXIOUS, OR ON EDGE: NOT AT ALL
GAD7 TOTAL SCORE: 0

## 2024-01-31 ASSESSMENT — PATIENT HEALTH QUESTIONNAIRE - PHQ9
SUM OF ALL RESPONSES TO PHQ QUESTIONS 1-9: 1
5. POOR APPETITE OR OVEREATING: NOT AT ALL

## 2024-01-31 NOTE — PROGRESS NOTES
Shantel is here for a 6-week postpartum checkup.    She had a repeat c/s of a viable boy, weight 8 pounds 8 oz., with no complications.  Since delivery, she has been breast feeding.  She has No signs of infection, bleeding or other complications.  She is not pregnant.  We discussed contraceptions and she has chosen vasectomy and condoms.  She denies feeling sad, depressed or too overwhelmed.      1/31/2024    10:01 AM   PHQ   PHQ-9 Total Score 1   Q9: Thoughts of better off dead/self-harm past 2 weeks Not at all         EXAM:    HEENT: grossly normal.  NECK: no lymphadenopathy or thyroidomegaly.  LUNGS: CTA X 2, no rales or crackles.  BACK: No spinal or CVA tenderness.  HEART: RRR without murmurs clicks or gallops.  ABDOMEN: soft, non tender, good bowel sounds, without masses rebound, guarding or tenderness. Incision well healed.    PELVIC:    deferred    EXTREMITIES: Warm to touch, good pulses, no ankle edema or calf tenderness.  NEUROLOGIC: grossly normal.    ASSESSMENT:   Normal 6-week postpartum exam after repeat c/s.    PLAN:  Pap smear utd and vasectomy and condoms for contraception. Ok to resume normal activities.  RTC yearly or prn.    DIONISIO GARZA MD

## 2024-02-07 ENCOUNTER — TELEPHONE (OUTPATIENT)
Dept: OBGYN | Facility: CLINIC | Age: 34
End: 2024-02-07
Payer: COMMERCIAL

## 2024-02-07 DIAGNOSIS — N61.0 MASTITIS: Primary | ICD-10-CM

## 2024-02-07 RX ORDER — DICLOXACILLIN SODIUM 500 MG
500 CAPSULE ORAL 4 TIMES DAILY
Qty: 40 CAPSULE | Refills: 0 | Status: SHIPPED | OUTPATIENT
Start: 2024-02-07 | End: 2024-02-17

## 2024-02-07 NOTE — TELEPHONE ENCOUNTER
Pt called clinic regarding breast symptoms; delivered on [birth year]/12/[birth year]  Prior 2 babies had mastitis-always on the left side  L side pain/redness    Breast became tender last night, woke up about 1am fever (100.8), chills, redness, more pain  Can't feel a lump or mass  Started ibuprofen, ice pack regimen without much improvement    Will discuss with on call MD and return call to patient  Pended dicloxacillin if appropriate (verify correct amount of days?)  Routing to provider to advise.  Gabriela Heller RN on 2/7/2024 at 8:46 AM

## 2024-02-07 NOTE — TELEPHONE ENCOUNTER
[facility] Call Center    Phone Message    May a detailed message be left on voicemail: yes     Reason for Call: Symptoms or Concerns     If patient has red-flag symptoms, warm transfer to triage line    Current symptom or concern: Pt experiencing possible mastitis. Fever, chills, breast pain and redness. Writer instructed by secure chat to send TE    Symptoms have been present for: 12 hour(s)    Has patient previously been seen for this? No    Are there any new or worsening symptoms? NO    Action Taken:  STEPHENIE ESPOSITO    Travel Screening: Not Applicable

## 2024-02-07 NOTE — TELEPHONE ENCOUNTER
Returned call to pt, discussed on call MD recommended starting abx-gave instructions for dicloxacillin and to contact clinic if symptoms worsen or aren't improved in the next 1-2 days.  Pt verbalized understanding, in agreement with plan, and voiced no further questions.  Gabriela Heller RN on 2/7/2024 at 10:16 AM

## 2024-02-08 ENCOUNTER — TELEPHONE (OUTPATIENT)
Dept: OBGYN | Facility: CLINIC | Age: 34
End: 2024-02-08
Payer: COMMERCIAL

## 2024-02-08 NOTE — TELEPHONE ENCOUNTER
Pt rx'ed dicloxacillin yesterday    Has had 4 pills so far.   Fever even with max dose rotating tylenol and ibuprofen is around 100.   Breast does have hard painful lump, no redness or swelling.    Breast emptying is going well, is feeding and baby is latching and eating enough.     Encouraged her to keep taking the antibiotics and tylenol/ibufrofen and to call us if she is not starting to feel better later this afternoon.     COLLIN Schwab

## 2024-02-08 NOTE — TELEPHONE ENCOUNTER
[facility] Call Center    Phone Message    May a detailed message be left on voicemail: yes     Reason for Call: Other: Pt calling to state that the antibiotics prescribed are not working and her symptoms are worsening. Please call to discuss     Action Taken: Other: Women's Health    Travel Screening: Not Applicable

## 2024-02-09 ENCOUNTER — APPOINTMENT (OUTPATIENT)
Dept: ULTRASOUND IMAGING | Facility: CLINIC | Age: 34
End: 2024-02-09
Attending: EMERGENCY MEDICINE
Payer: COMMERCIAL

## 2024-02-09 ENCOUNTER — HOSPITAL ENCOUNTER (EMERGENCY)
Facility: CLINIC | Age: 34
Discharge: HOME | End: 2024-02-10
Attending: EMERGENCY MEDICINE | Admitting: EMERGENCY MEDICINE
Payer: COMMERCIAL

## 2024-02-09 VITALS
WEIGHT: 150 LBS | BODY MASS INDEX: 22.22 KG/M2 | HEIGHT: 69 IN | HEART RATE: 96 BPM | TEMPERATURE: 100.8 F | DIASTOLIC BLOOD PRESSURE: 57 MMHG | OXYGEN SATURATION: 96 % | RESPIRATION RATE: 18 BRPM | SYSTOLIC BLOOD PRESSURE: 107 MMHG

## 2024-02-09 DIAGNOSIS — N61.0 MASTITIS: ICD-10-CM

## 2024-02-09 LAB
ALBUMIN SERPL BCG-MCNC: 3.5 G/DL (ref 3.5–5.2)
ALP SERPL-CCNC: 105 U/L (ref 40–150)
ALT SERPL W P-5'-P-CCNC: 21 U/L (ref 0–50)
ANION GAP SERPL CALCULATED.3IONS-SCNC: 10 MMOL/L (ref 7–15)
AST SERPL W P-5'-P-CCNC: 21 U/L (ref 0–45)
BASE EXCESS BLDV CALC-SCNC: -1.9 MMOL/L (ref -3–3)
BASOPHILS # BLD AUTO: 0 10E3/UL (ref 0–0.2)
BASOPHILS NFR BLD AUTO: 0 %
BILIRUB SERPL-MCNC: 0.5 MG/DL
BUN SERPL-MCNC: 7.7 MG/DL (ref 6–20)
CALCIUM SERPL-MCNC: 8.4 MG/DL (ref 8.6–10)
CHLORIDE SERPL-SCNC: 101 MMOL/L (ref 98–107)
CREAT SERPL-MCNC: 0.81 MG/DL (ref 0.51–0.95)
DEPRECATED HCO3 PLAS-SCNC: 22 MMOL/L (ref 22–29)
EGFRCR SERPLBLD CKD-EPI 2021: >90 ML/MIN/1.73M2
EOSINOPHIL # BLD AUTO: 0.2 10E3/UL (ref 0–0.7)
EOSINOPHIL NFR BLD AUTO: 3 %
ERYTHROCYTE [DISTWIDTH] IN BLOOD BY AUTOMATED COUNT: 14.4 % (ref 10–15)
GLUCOSE SERPL-MCNC: 125 MG/DL (ref 70–99)
HCO3 BLDV-SCNC: 23 MMOL/L (ref 21–28)
HCT VFR BLD AUTO: 32.7 % (ref 35–47)
HGB BLD-MCNC: 10.8 G/DL (ref 11.7–15.7)
HOLD SPECIMEN: NORMAL
IMM GRANULOCYTES # BLD: 0 10E3/UL
IMM GRANULOCYTES NFR BLD: 0 %
LACTATE SERPL-SCNC: 0.4 MMOL/L (ref 0.7–2)
LYMPHOCYTES # BLD AUTO: 1 10E3/UL (ref 0.8–5.3)
LYMPHOCYTES NFR BLD AUTO: 14 %
MCH RBC QN AUTO: 28.8 PG (ref 26.5–33)
MCHC RBC AUTO-ENTMCNC: 33 G/DL (ref 31.5–36.5)
MCV RBC AUTO: 87 FL (ref 78–100)
MONOCYTES # BLD AUTO: 0.4 10E3/UL (ref 0–1.3)
MONOCYTES NFR BLD AUTO: 5 %
NEUTROPHILS # BLD AUTO: 5.9 10E3/UL (ref 1.6–8.3)
NEUTROPHILS NFR BLD AUTO: 78 %
NRBC # BLD AUTO: 0 10E3/UL
NRBC BLD AUTO-RTO: 0 /100
O2/TOTAL GAS SETTING VFR VENT: 21 %
OXYHGB MFR BLDV: 90 % (ref 70–75)
PCO2 BLDV: 34 MM HG (ref 40–50)
PH BLDV: 7.43 [PH] (ref 7.32–7.43)
PLATELET # BLD AUTO: 205 10E3/UL (ref 150–450)
PO2 BLDV: 64 MM HG (ref 25–47)
POTASSIUM SERPL-SCNC: 3.7 MMOL/L (ref 3.4–5.3)
PROT SERPL-MCNC: 6.3 G/DL (ref 6.4–8.3)
RBC # BLD AUTO: 3.75 10E6/UL (ref 3.8–5.2)
SAO2 % BLDV: 90.4 % (ref 70–75)
SODIUM SERPL-SCNC: 133 MMOL/L (ref 135–145)
WBC # BLD AUTO: 7.5 10E3/UL (ref 4–11)

## 2024-02-09 PROCEDURE — 250N000011 HC RX IP 250 OP 636: Performed by: EMERGENCY MEDICINE

## 2024-02-09 PROCEDURE — 82805 BLOOD GASES W/O2 SATURATION: CPT | Performed by: EMERGENCY MEDICINE

## 2024-02-09 PROCEDURE — 83605 ASSAY OF LACTIC ACID: CPT | Performed by: EMERGENCY MEDICINE

## 2024-02-09 PROCEDURE — 250N000013 HC RX MED GY IP 250 OP 250 PS 637: Performed by: EMERGENCY MEDICINE

## 2024-02-09 PROCEDURE — 82040 ASSAY OF SERUM ALBUMIN: CPT | Performed by: EMERGENCY MEDICINE

## 2024-02-09 PROCEDURE — 36415 COLL VENOUS BLD VENIPUNCTURE: CPT | Performed by: EMERGENCY MEDICINE

## 2024-02-09 PROCEDURE — 96374 THER/PROPH/DIAG INJ IV PUSH: CPT

## 2024-02-09 PROCEDURE — 96361 HYDRATE IV INFUSION ADD-ON: CPT

## 2024-02-09 PROCEDURE — 85025 COMPLETE CBC W/AUTO DIFF WBC: CPT | Performed by: EMERGENCY MEDICINE

## 2024-02-09 PROCEDURE — 76642 ULTRASOUND BREAST LIMITED: CPT | Mod: LT

## 2024-02-09 PROCEDURE — 258N000003 HC RX IP 258 OP 636: Performed by: EMERGENCY MEDICINE

## 2024-02-09 PROCEDURE — 99284 EMERGENCY DEPT VISIT MOD MDM: CPT | Mod: 25

## 2024-02-09 RX ORDER — KETOROLAC TROMETHAMINE 10 MG/1
10 TABLET, FILM COATED ORAL EVERY 6 HOURS PRN
Qty: 20 TABLET | Refills: 0 | Status: SHIPPED | OUTPATIENT
Start: 2024-02-09 | End: 2024-02-29

## 2024-02-09 RX ORDER — KETOROLAC TROMETHAMINE 15 MG/ML
15 INJECTION, SOLUTION INTRAMUSCULAR; INTRAVENOUS ONCE
Status: COMPLETED | OUTPATIENT
Start: 2024-02-09 | End: 2024-02-09

## 2024-02-09 RX ORDER — CLINDAMYCIN HCL 150 MG
450 CAPSULE ORAL ONCE
Status: COMPLETED | OUTPATIENT
Start: 2024-02-09 | End: 2024-02-09

## 2024-02-09 RX ORDER — CLINDAMYCIN HCL 150 MG
450 CAPSULE ORAL 3 TIMES DAILY
Qty: 90 CAPSULE | Refills: 0 | Status: SHIPPED | OUTPATIENT
Start: 2024-02-09 | End: 2024-02-19

## 2024-02-09 RX ORDER — ACETAMINOPHEN 325 MG/1
650 TABLET ORAL ONCE
Status: COMPLETED | OUTPATIENT
Start: 2024-02-09 | End: 2024-02-09

## 2024-02-09 RX ADMIN — CLINDAMYCIN HYDROCHLORIDE 450 MG: 150 CAPSULE ORAL at 22:41

## 2024-02-09 RX ADMIN — SODIUM CHLORIDE, POTASSIUM CHLORIDE, SODIUM LACTATE AND CALCIUM CHLORIDE 1000 ML: 600; 310; 30; 20 INJECTION, SOLUTION INTRAVENOUS at 21:23

## 2024-02-09 RX ADMIN — KETOROLAC TROMETHAMINE 15 MG: 15 INJECTION, SOLUTION INTRAMUSCULAR; INTRAVENOUS at 21:23

## 2024-02-09 RX ADMIN — ACETAMINOPHEN 650 MG: 325 TABLET ORAL at 21:10

## 2024-02-09 ASSESSMENT — ACTIVITIES OF DAILY LIVING (ADL)
ADLS_ACUITY_SCORE: 35
ADLS_ACUITY_SCORE: 35

## 2024-02-10 NOTE — ED TRIAGE NOTES
Patient presents to the ED complaining of left breast mastitis she started having symptoms Tuesday and was started on Doxycycline Wednesday but the fevers, pain and swelling is getting worse.  She states this is her fifth time with mastitis and Doxycycline worked all the other times but she recently had strep twice and was on antibiotics for that stopping Cefdinir six days before breast symptoms started.       Triage Assessment (Adult)       Row Name 02/09/24 1939          Triage Assessment    Airway WDL WDL        Respiratory WDL    Respiratory WDL WDL        Skin Circulation/Temperature WDL    Skin Circulation/Temperature WDL WDL        Cardiac WDL    Cardiac WDL WDL        Peripheral/Neurovascular WDL    Peripheral Neurovascular WDL WDL        Cognitive/Neuro/Behavioral WDL    Cognitive/Neuro/Behavioral WDL WDL

## 2024-02-10 NOTE — ED PROVIDER NOTES
EMERGENCY DEPARTMENT ENCOUNTER      NAME: Shantel Martinez  AGE: 33 year old female  YOB: 1990  MRN: [id number]  EVALUATION DATE & TIME: 2/9/2024  8:05 PM    PCP: No Ref-Primary, Physician    ED PROVIDER: Jannet Marx M.D.      Chief Complaint   Patient presents with    Breast Problem     Mastitis left breast       FINAL IMPRESSION:  1. Mastitis        ED COURSE & MEDICAL DECISION MAKING:    Left breast mastitis.  Ultrasound ordered to rule out abscess.  Status post almost 3 days of dicloxacillin without improvement, febrile in ED, nontoxic-appearing.  Lactic acid, blood gas, CBC, CMP ordered, stable.  Clindamycin ordered to cover MRSA, cannot take Bactrim due to allergy.  Toradol, IV fluids given.  Denies any nausea, currently. Has Zofran at home.  Ultrasound left breast performed to rule out abscess.      8:40 PM I met with the patient to gather history and to perform my initial exam. I discussed the plan for care while in the Emergency Department.  9:56 PM ultrasound performed.     Pertinent Labs & Imaging studies reviewed. (See chart for details).    Medical Decision Making  Obtained supplemental history:Supplemental history obtained?: No  Reviewed external records: External records reviewed?: Outpatient Record: [facility] on 2/7/24, 10/9/23 and 1/25/24 at [facility] [place]  Care impacted by chronic illness:Other: PCOS  Care significantly affected by social determinants of health:N/A  Did you consider but not order tests?: Work up considered but not performed and documented in chart, if applicable  Did you interpret images independently?: Independent interpretation of ECG and images noted in documentation, when applicable.  Consultation discussion with other provider:Did you involve another provider (consultant, , pharmacy, etc.)?: No  Discharge. I prescribed additional prescription strength medication(s) as charted. See documentation for any additional  details.    At the conclusion of the encounter I discussed the results of all of the tests and the disposition. The questions were answered. The patient or family acknowledged understanding and was agreeable with the care plan.      CRITICAL CARE:  N/A    HPI    Patient information was obtained from: Patient    Use of : N/A        Shantel Martinez is a 33 year old female who presents with a breast problem.    Patient reports mastitis to left breast for past couple days. She has been taking dicloxacillin 4 times a day for past 2 days and has taken 3 doses today. Patient says she thought it might be improving but her fever returned this afternoon so she came to ED. She also notes having strep twice in a row from her kids recently. Patient is currently breastfeeding and endorses that left breast is still giving milk, bu that it is very painful. She has tried taking tylenol and ibuprofen without relief. Her last dose of Tylenol was 1000 mg at 12 PM and ibuprofen 600 mg at 7 PM. 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 Otherwise, patient denies any history of immunoneutralization, or having to take any Zofran yet. She trevizo snot know if there is any new lumps to left breast.     Per Chart Review: Patient called [facility] on 2/7/24. She was prescribed dicloxacillin.    Patient was seen on 10/9/23 at [facility] [place] for strep. And on 1/25/24 for strep, for which she was prescribed Cefdinir.       REVIEW OF SYSTEMS  All other systems negative unless noted in HPI.    PAST MEDICAL HISTORY:  Past Medical History:   Diagnosis Date    Amniotic fluid embolus, third trimester 12/05/2021    Carrier of group B Streptococcus     GBS (group B Streptococcus carrier), +RV culture, currently pregnant 11/26/2021    History of abnormal Pap smear 05/12/2014    Formatting of this note is different from the original. Date Procedure Result Plan  6/2013 Pap ASCUS HPV+   4/2014 Pap LSIL  "Colpo  2014 Colpo ECC negative Pap 1 year    PCOS (polycystic ovarian syndrome)     Postpartum depression     Varicella        PAST SURGICAL HISTORY:  Past Surgical History:   Procedure Laterality Date     SECTION      due toamniotic fluid embolism     SECTION N/A 2023    Procedure:  SECTION;  Surgeon: Kina Jeter MD;  Location: [facility] L[facility]    COLPOSCOPY CERVIX, BIOPSY CERVIX, ENDOCERVICAL CURETTAGE, COMBINED      WISDOM TOOTH EXTRACTION           CURRENT MEDICATIONS:    No current facility-administered medications for this encounter.     Current Outpatient Medications   Medication    clindamycin (CLEOCIN) 150 MG capsule    ketorolac (TORADOL) 10 MG tablet    dicloxacillin (DYNAPEN) 500 MG capsule    ferrous sulfate (FEROSUL) 325 (65 Fe) MG tablet    ibuprofen (ADVIL/MOTRIN) 600 MG tablet    lecithin (LECITHIN) 1200 MG CAPS capsule    Prenatal Vit-DSS-Fe Cbn-FA (PRENATAL AD PO)         ALLERGIES:  Allergies   Allergen Reactions    Bactrim [Sulfamethoxazole-Trimethoprim] Other (See Comments)     Throat swelling       FAMILY HISTORY:  Family History   Problem Relation Age of Onset    Prostate Cancer Father 55       SOCIAL HISTORY:  Social History     Socioeconomic History    Marital status:    Tobacco Use    Smoking status: Never    Smokeless tobacco: Never   Vaping Use    Vaping Use: Never used   Substance and Sexual Activity    Alcohol use: Not Currently     Comment: occasional    Drug use: Never    Sexual activity: Yes     Partners: Male   Social History Narrative    2022:  working in health care at the   is a PA in GI oncology, lives with  and 2 sons ( almost 3 &8 month old), has 2 cats,        VITALS:  Patient Vitals for the past 24 hrs:   BP Temp Temp src Pulse Resp SpO2 Height Weight   24 2245 107/57 -- -- 96 18 96 % -- --   24 1943 (!) 145/79 (!) 100.8  F (38.2  C) Oral (!) 126 20 98 % 1.753 m (5' 9\") 68 kg (150 lb)       PHYSICAL EXAM    VITAL " "SIGNS: /57   Pulse 96   Temp (!) 100.8  F (38.2  C) (Oral)   Resp 18   Ht 1.753 m (5' 9\")   Wt 68 kg (150 lb)   LMP 03/04/2023   SpO2 96%   Breastfeeding Yes   BMI 22.15 kg/m    Physical Exam  Vitals and nursing note reviewed.   Constitutional:       Appearance: She is not toxic-appearing.      Comments: Appears uncomfortable   Eyes:      General: No scleral icterus.        Right eye: No discharge.         Left eye: No discharge.   Cardiovascular:      Rate and Rhythm: Regular rhythm.      Comments: Borderline tachycardic  Pulmonary:      Effort: Pulmonary effort is normal. No respiratory distress.      Breath sounds: Normal breath sounds.   Musculoskeletal:         General: No swelling or deformity.      Cervical back: Neck supple. No rigidity.   Skin:     General: Skin is dry.      Capillary Refill: Capillary refill takes less than 2 seconds.      Comments: Hot to the touch.  Left breast erythema, warmth overlying 12-2 o'clock not involving nipple.    Neurological:      General: No focal deficit present.      Mental Status: She is alert and oriented to person, place, and time. Mental status is at baseline.      Comments: No slurred speech, following commands spontaneously. No facial droop.   Psychiatric:         Mood and Affect: Mood normal.         Behavior: Behavior normal.         LABS  Labs Ordered and Resulted from Time of ED Arrival to Time of ED Departure   LACTIC ACID WHOLE BLOOD - Abnormal       Result Value    Lactic Acid 0.4 (*)    COMPREHENSIVE METABOLIC PANEL - Abnormal    Sodium 133 (*)     Potassium 3.7      Carbon Dioxide (CO2) 22      Anion Gap 10      Urea Nitrogen 7.7      Creatinine 0.81      GFR Estimate >90      Calcium 8.4 (*)     Chloride 101      Glucose 125 (*)     Alkaline Phosphatase 105      AST 21      ALT 21      Protein Total 6.3 (*)     Albumin 3.5      Bilirubin Total 0.5     BLOOD GAS VENOUS - Abnormal    pH Venous 7.43      pCO2 Venous 34 (*)     pO2 Venous 64 (*) "     Bicarbonate Venous 23      Base Excess/Deficit Venous -1.9      FIO2 21      Oxyhemoglobin Venous 90 (*)     O2 Sat, Venous 90.4 (*)    CBC WITH PLATELETS AND DIFFERENTIAL - Abnormal    WBC Count 7.5      RBC Count 3.75 (*)     Hemoglobin 10.8 (*)     Hematocrit 32.7 (*)     MCV 87      MCH 28.8      MCHC 33.0      RDW 14.4      Platelet Count 205      % Neutrophils 78      % Lymphocytes 14      % Monocytes 5      % Eosinophils 3      % Basophils 0      % Immature Granulocytes 0      NRBCs per 100 WBC 0      Absolute Neutrophils 5.9      Absolute Lymphocytes 1.0      Absolute Monocytes 0.4      Absolute Eosinophils 0.2      Absolute Basophils 0.0      Absolute Immature Granulocytes 0.0      Absolute NRBCs 0.0           RADIOLOGY  US Breast Left Limited 1-3 Quadrants    (Results Pending)      I have independently reviewed the above image. See radiology report for detail.      EKG:    NA       PROCEDURES:  N/A      MEDICATIONS GIVEN IN THE EMERGENCY:  Medications   lactated ringers BOLUS 1,000 mL (0 mLs Intravenous Stopped 2/9/24 2223)   acetaminophen (TYLENOL) tablet 650 mg (650 mg Oral $Given 2/9/24 2110)   ketorolac (TORADOL) injection 15 mg (15 mg Intravenous $Given 2/9/24 2123)   clindamycin (CLEOCIN) capsule 450 mg (450 mg Oral $Given 2/9/24 2241)       NEW PRESCRIPTIONS STARTED AT TODAY'S ER VISIT  New Prescriptions    CLINDAMYCIN (CLEOCIN) 150 MG CAPSULE    Take 3 capsules (450 mg) by mouth 3 times daily for 10 days    KETOROLAC (TORADOL) 10 MG TABLET    Take 1 tablet (10 mg) by mouth every 6 hours as needed for moderate pain        I, Delisa Barboza, am serving as a scribe to document services personally performed by Jannet Marx MD, based on my observations and the provider's statements to me.  I, Jannet Marx MD, attest that Delisa Barboza is acting in a scribe capacity, has observed my performance of the services and has documented them in accordance with my direction.     Jannet Marx MD  Emergency  [facility] EMERGENCY ROOM  [address] [address]  [phone]  Dept: [phone]             Jannet Marx MD  02/09/24 7540

## 2024-02-20 ENCOUNTER — MEDICAL CORRESPONDENCE (OUTPATIENT)
Dept: HEALTH INFORMATION MANAGEMENT | Facility: CLINIC | Age: 34
End: 2024-02-20
Payer: COMMERCIAL

## 2024-02-23 ENCOUNTER — MYC MEDICAL ADVICE (OUTPATIENT)
Dept: OBGYN | Facility: CLINIC | Age: 34
End: 2024-02-23
Payer: COMMERCIAL

## 2024-02-26 NOTE — TELEPHONE ENCOUNTER
PAPERWORK REQUEST    Form received on: 02/23/24  How was form received: Milo  Preferred Provider: Kina Jeter MD  Type of form: Return to Work  Date needed by: as soon as available  What to do with form once completed: Please fax to [phone]  Where was form placed?: in RYAN's basket for signature  Has an CELSO been signed? Not Applicable    Additional Notes:      Frida Leigh/her/hers  [facility]

## 2024-02-27 ENCOUNTER — TELEPHONE (OUTPATIENT)
Dept: OBGYN | Facility: CLINIC | Age: 34
End: 2024-02-27
Payer: COMMERCIAL

## 2024-02-27 DIAGNOSIS — N61.0 MASTITIS: Primary | ICD-10-CM

## 2024-02-27 RX ORDER — CLINDAMYCIN HCL 150 MG
450 CAPSULE ORAL 3 TIMES DAILY
Qty: 90 CAPSULE | Refills: 0 | Status: SHIPPED | OUTPATIENT
Start: 2024-02-27 | End: 2024-03-08

## 2024-02-27 NOTE — TELEPHONE ENCOUNTER
Ok, let's try another round of the clindamycin since that worked.  The order is placed, please sign when you have her preferred pharmacy.  Thanks    DIONISIO GARZA MD

## 2024-02-27 NOTE — TELEPHONE ENCOUNTER
Mastitis - 2 weeks ago in left breast  ER-Clindamyicin for left breast   R Breast - fever chills, triangle of redness on outside of breast.    Pt feels at her [place] end and overwhelmed.   Routed to Dr Jeter

## 2024-02-27 NOTE — TELEPHONE ENCOUNTER
Can you confirm which breast she is having symptoms?  In her mychart from this morning it says left, but your note says right.  I do think meeting with Jazmin again to help come up with a plan to reduce supply or wean off, if that is what she desires, would be more helpful than myself or partners.  In terms of her new symptoms, we likely need to treat again, so I just need additional info of:    Did her symptoms completely clear from 2 weeks ago when she was given clindamycin in the ER?  If not, it would be helpful to see her in the office to get a culture from her breast milk to determine what the bug is and if it is resistant to any Abx.  That could be done by on call MD (or CNM if no MD avail).  If she did get better and this is new, we can retreat with clinda since that worked, but would also encourage her to work with Jazmin to determine best way to decrease supply.    Thanks    DIONISIO GARZA MD

## 2024-02-27 NOTE — TELEPHONE ENCOUNTER
[facility] Call Center    Phone Message    May a detailed message be left on voicemail: yes     Reason for Call: Other: this pt was calling to speak with her care team. Pt states she has already seen lactation and does not want to see lactation again.Please call pt back to discuss.      Action Taken: Other: OBGYN    Travel Screening: Not Applicable

## 2024-02-28 RX ORDER — AMOXICILLIN 500 MG/1
500 CAPSULE ORAL 2 TIMES DAILY
COMMUNITY
Start: 2024-01-14 | End: 2024-01-24

## 2024-02-28 RX ORDER — CEFDINIR 300 MG/1
CAPSULE ORAL
COMMUNITY
Start: 2024-01-25 | End: 2024-02-29

## 2024-02-29 ENCOUNTER — OFFICE VISIT (OUTPATIENT)
Dept: MIDWIFE SERVICES | Facility: CLINIC | Age: 34
End: 2024-02-29
Payer: COMMERCIAL

## 2024-02-29 VITALS — HEART RATE: 82 BPM | SYSTOLIC BLOOD PRESSURE: 106 MMHG | DIASTOLIC BLOOD PRESSURE: 62 MMHG

## 2024-02-29 DIAGNOSIS — N60.19 CHRONIC MASTITIS, UNSPECIFIED LATERALITY: Primary | ICD-10-CM

## 2024-02-29 PROCEDURE — 99215 OFFICE O/P EST HI 40 MIN: CPT | Performed by: ADVANCED PRACTICE MIDWIFE

## 2024-02-29 PROCEDURE — 87077 CULTURE AEROBIC IDENTIFY: CPT | Performed by: ADVANCED PRACTICE MIDWIFE

## 2024-02-29 PROCEDURE — 87070 CULTURE OTHR SPECIMN AEROBIC: CPT | Performed by: ADVANCED PRACTICE MIDWIFE

## 2024-02-29 PROCEDURE — 87205 SMEAR GRAM STAIN: CPT | Performed by: ADVANCED PRACTICE MIDWIFE

## 2024-02-29 PROCEDURE — 87186 SC STD MICRODIL/AGAR DIL: CPT | Performed by: ADVANCED PRACTICE MIDWIFE

## 2024-02-29 NOTE — PATIENT INSTRUCTIONS
Breast-specific probiotics may be helpful for people who have persistent breast pain, as they may re-establish a balance of healthy bacteria in the breast.  Lactobacillus fermentum and Lactobacillus salivarius are the types of probiotics to consider for this purpose.      Some brands containing L. Fermentum are:   E-Lactia  Legendairy Milk Lactabiotic  DealisedMagee General HospitalDemeure:  Target B2 (Breast and Baby) Probiotic    These brands contains both L. Fermenum and L. Salivarius:   Solaray Mycrobiome Probiotic Women's Formula or Pre/Post    Lactation [facility] Breast [facility] +

## 2024-03-03 LAB
BACTERIA MLK AEROBE CULT: ABNORMAL
GRAM STAIN RESULT: ABNORMAL
GRAM STAIN RESULT: ABNORMAL

## 2024-03-04 ENCOUNTER — TELEPHONE (OUTPATIENT)
Dept: MIDWIFE SERVICES | Facility: CLINIC | Age: 34
End: 2024-03-04
Payer: COMMERCIAL

## 2024-03-04 DIAGNOSIS — N60.19 CHRONIC MASTITIS, UNSPECIFIED LATERALITY: Primary | ICD-10-CM

## 2024-03-04 LAB
BACTERIA MLK AEROBE CULT: ABNORMAL
GRAM STAIN RESULT: ABNORMAL
GRAM STAIN RESULT: ABNORMAL

## 2024-03-04 RX ORDER — ERYTHROMYCIN 500 MG/1
500 TABLET, DELAYED RELEASE ORAL EVERY 12 HOURS
Qty: 14 TABLET | Refills: 0 | Status: SHIPPED | OUTPATIENT
Start: 2024-03-04

## 2024-03-04 NOTE — CONFIDENTIAL NOTE
Called Shantel with results of milk culture--S. Epidermidis, resistant to oxacillin and clindamycin, both previously used and incompletely effective, but sensitive to erythromycin.  Shantel reports she continues on the clindamyicn and feels systemically better, no fever, but breasts still very achy and uncomfortable and requiring RTC ibuprofen for comfort.  Recommended changing to erythromycin 500 mg q12h for one week--she agrees.  RX transmitted to pharmacy--will follow up by MyChart or phone in one week.

## 2024-04-11 ENCOUNTER — MEDICAL CORRESPONDENCE (OUTPATIENT)
Dept: HEALTH INFORMATION MANAGEMENT | Facility: CLINIC | Age: 34
End: 2024-04-11

## 2024-07-08 ENCOUNTER — TRANSFERRED RECORDS (OUTPATIENT)
Dept: HEALTH INFORMATION MANAGEMENT | Facility: CLINIC | Age: 34
End: 2024-07-08
Payer: COMMERCIAL

## 2024-12-14 ENCOUNTER — HEALTH MAINTENANCE LETTER (OUTPATIENT)
Age: 34
End: 2024-12-14

## 2024-12-20 NOTE — DISCHARGE INSTRUCTIONS
Blood work stable, negative lactic acid and white blood cell count.  We need to cover you for MRSA with clindamycin, stop taking dicloxacillin.   (2) Forgets Limitations

## (undated) DEVICE — SOL NACL 0.9% IRRIG 1000ML BOTTLE 07138-09

## (undated) DEVICE — ESU PENCIL W/SMOKE EVAC NEPTUNE STRYKER 0703-046-000

## (undated) DEVICE — STRAP KNEE/BODY 31143004

## (undated) DEVICE — SU VICRYL 0 CT-1 36" J346H

## (undated) DEVICE — CATH TRAY FOLEY 16FR BARDEX W/DRAIN BAG STATLOCK 300316A

## (undated) DEVICE — SU VICRYL 4-0 PS-2 18" UND J496G

## (undated) DEVICE — PACK C-SECTION LF PL15OTA83B

## (undated) DEVICE — GLOVE ESTEEM POWDER FREE SMT 6.5  2D72PT65

## (undated) DEVICE — GLOVE PROTEXIS BLUE W/NEU-THERA 6.5  2D73EB65

## (undated) DEVICE — STOCKING SLEEVE COMPRESSION CALF LG

## (undated) DEVICE — DRSG STERI STRIP 1/4X3" R1541

## (undated) DEVICE — SOL WATER IRRIG 1000ML BOTTLE 07139-09

## (undated) DEVICE — PREP CHLORAPREP 26ML TINTED ORANGE  260815

## (undated) DEVICE — SU MONOCRYL 0 CT-1 36" Y346H

## (undated) DEVICE — SOL ADH LIQUID BENZOIN SWAB 0.6ML C1544

## (undated) RX ORDER — EPINEPHRINE 0.1 MG/ML
INJECTION INTRAVENOUS
Status: DISPENSED
Start: 2023-12-12

## (undated) RX ORDER — EPHEDRINE SULFATE 50 MG/ML
INJECTION, SOLUTION INTRAMUSCULAR; INTRAVENOUS; SUBCUTANEOUS
Status: DISPENSED
Start: 2023-12-12

## (undated) RX ORDER — MORPHINE SULFATE 1 MG/ML
INJECTION, SOLUTION EPIDURAL; INTRATHECAL; INTRAVENOUS
Status: DISPENSED
Start: 2023-12-12

## (undated) RX ORDER — BUPIVACAINE HYDROCHLORIDE 2.5 MG/ML
INJECTION, SOLUTION EPIDURAL; INFILTRATION; INTRACAUDAL
Status: DISPENSED
Start: 2023-12-12

## (undated) RX ORDER — ONDANSETRON 2 MG/ML
INJECTION INTRAMUSCULAR; INTRAVENOUS
Status: DISPENSED
Start: 2023-12-12

## (undated) RX ORDER — FENTANYL CITRATE 50 UG/ML
INJECTION, SOLUTION INTRAMUSCULAR; INTRAVENOUS
Status: DISPENSED
Start: 2023-12-12